# Patient Record
Sex: FEMALE | Race: BLACK OR AFRICAN AMERICAN | NOT HISPANIC OR LATINO | Employment: OTHER | URBAN - METROPOLITAN AREA
[De-identification: names, ages, dates, MRNs, and addresses within clinical notes are randomized per-mention and may not be internally consistent; named-entity substitution may affect disease eponyms.]

---

## 2023-02-08 PROBLEM — E11.9 DIABETES MELLITUS TYPE 2 WITHOUT RETINOPATHY (MULTI): Status: ACTIVE | Noted: 2023-02-08

## 2023-02-08 PROBLEM — H01.009 BLEPHARITIS: Status: ACTIVE | Noted: 2023-02-08

## 2023-02-08 PROBLEM — M79.674 GREAT TOE PAIN, RIGHT: Status: ACTIVE | Noted: 2023-02-08

## 2023-02-08 PROBLEM — I73.9 PAD (PERIPHERAL ARTERY DISEASE) (CMS-HCC): Status: ACTIVE | Noted: 2023-02-08

## 2023-02-08 PROBLEM — M54.50 CHRONIC LEFT-SIDED LOW BACK PAIN WITHOUT SCIATICA: Status: ACTIVE | Noted: 2023-02-08

## 2023-02-08 PROBLEM — K21.9 GERD (GASTROESOPHAGEAL REFLUX DISEASE): Status: ACTIVE | Noted: 2023-02-08

## 2023-02-08 PROBLEM — E03.9 HYPOTHYROIDISM: Status: ACTIVE | Noted: 2023-02-08

## 2023-02-08 PROBLEM — E51.9 VITAMIN B1 DEFICIENCY: Status: ACTIVE | Noted: 2023-02-08

## 2023-02-08 PROBLEM — H40.1111 PRIMARY OPEN ANGLE GLAUCOMA (POAG) OF RIGHT EYE, MILD STAGE: Status: ACTIVE | Noted: 2023-02-08

## 2023-02-08 PROBLEM — N18.2: Status: ACTIVE | Noted: 2023-02-08

## 2023-02-08 PROBLEM — K70.9 ALCOHOLIC LIVER DISEASE (MULTI): Status: ACTIVE | Noted: 2023-02-08

## 2023-02-08 PROBLEM — H26.493 POSTERIOR CAPSULAR OPACIFICATION VISUALLY SIGNIFICANT OF BOTH EYES: Status: ACTIVE | Noted: 2023-02-08

## 2023-02-08 PROBLEM — H40.1131 CHRONIC OPEN ANGLE GLAUCOMA OF BOTH EYES, MILD STAGE: Status: ACTIVE | Noted: 2023-02-08

## 2023-02-08 PROBLEM — R35.1 NOCTURIA: Status: ACTIVE | Noted: 2023-02-08

## 2023-02-08 PROBLEM — K70.30: Status: ACTIVE | Noted: 2023-02-08

## 2023-02-08 PROBLEM — M54.2 NECK PAIN, MUSCULOSKELETAL: Status: ACTIVE | Noted: 2023-02-08

## 2023-02-08 PROBLEM — L60.0 INGROWING TOENAIL: Status: ACTIVE | Noted: 2023-02-08

## 2023-02-08 PROBLEM — J33.9 NASAL POLYPOSIS: Status: ACTIVE | Noted: 2023-02-08

## 2023-02-08 PROBLEM — H26.499 PCO (POSTERIOR CAPSULAR OPACIFICATION): Status: ACTIVE | Noted: 2023-02-08

## 2023-02-08 PROBLEM — R04.0 EPISTAXIS: Status: ACTIVE | Noted: 2023-02-08

## 2023-02-08 PROBLEM — M54.50 LOW BACK PAIN: Status: ACTIVE | Noted: 2023-02-08

## 2023-02-08 PROBLEM — K74.60 CIRRHOSIS OF LIVER (MULTI): Status: ACTIVE | Noted: 2023-02-08

## 2023-02-08 PROBLEM — E78.1 ESSENTIAL HYPERTRIGLYCERIDEMIA: Status: ACTIVE | Noted: 2023-02-08

## 2023-02-08 PROBLEM — M25.511 ACUTE PAIN OF RIGHT SHOULDER: Status: ACTIVE | Noted: 2023-02-08

## 2023-02-08 PROBLEM — H04.123 DRY EYE SYNDROME OF BOTH LACRIMAL GLANDS: Status: ACTIVE | Noted: 2023-02-08

## 2023-02-08 PROBLEM — R22.1 LOCALIZED SWELLING, MASS AND LUMP, NECK: Status: ACTIVE | Noted: 2023-02-08

## 2023-02-08 PROBLEM — H43.399 VITREOUS FLOATERS: Status: ACTIVE | Noted: 2023-02-08

## 2023-02-08 PROBLEM — E11.9 T2DM (TYPE 2 DIABETES MELLITUS) (MULTI): Status: ACTIVE | Noted: 2023-02-08

## 2023-02-08 PROBLEM — E87.1 HYPONATREMIA: Status: ACTIVE | Noted: 2023-02-08

## 2023-02-08 PROBLEM — H40.1190 PRIMARY OPEN ANGLE GLAUCOMA: Status: ACTIVE | Noted: 2023-02-08

## 2023-02-08 PROBLEM — K76.82 HEPATIC ENCEPHALOPATHY (MULTI): Status: ACTIVE | Noted: 2023-02-08

## 2023-02-08 PROBLEM — I85.00 ESOPHAGEAL VARICES (MULTI): Status: ACTIVE | Noted: 2023-02-08

## 2023-02-08 PROBLEM — N18.31 CHRONIC KIDNEY DISEASE, STAGE 3A (MULTI): Status: ACTIVE | Noted: 2023-02-08

## 2023-02-08 PROBLEM — J34.89 NASAL MUCOSA DRY: Status: ACTIVE | Noted: 2023-02-08

## 2023-02-08 PROBLEM — E87.5 HYPERKALEMIA: Status: ACTIVE | Noted: 2023-02-08

## 2023-02-08 PROBLEM — R51.9 HEADACHE: Status: ACTIVE | Noted: 2023-02-08

## 2023-02-08 PROBLEM — M85.80 OSTEOPENIA: Status: ACTIVE | Noted: 2023-02-08

## 2023-02-08 PROBLEM — M25.512 ACUTE PAIN OF LEFT SHOULDER: Status: ACTIVE | Noted: 2023-02-08

## 2023-02-08 PROBLEM — H40.9 GLAUCOMA: Status: ACTIVE | Noted: 2023-02-08

## 2023-02-08 PROBLEM — D64.9 ANEMIA: Status: ACTIVE | Noted: 2023-02-08

## 2023-02-08 PROBLEM — Z96.1 PRESENCE OF ARTIFICIAL INTRA-OCULAR LENS: Status: ACTIVE | Noted: 2023-02-08

## 2023-02-08 PROBLEM — H40.1122 PRIMARY OPEN ANGLE GLAUCOMA (POAG) OF LEFT EYE, MODERATE STAGE: Status: ACTIVE | Noted: 2023-02-08

## 2023-02-08 PROBLEM — G89.29 CHRONIC LEFT-SIDED LOW BACK PAIN WITHOUT SCIATICA: Status: ACTIVE | Noted: 2023-02-08

## 2023-02-08 PROBLEM — E55.9 VITAMIN D DEFICIENCY: Status: ACTIVE | Noted: 2023-02-08

## 2023-02-08 PROBLEM — J30.9 ALLERGIC RHINITIS: Status: ACTIVE | Noted: 2023-02-08

## 2023-02-08 PROBLEM — E11.22: Status: ACTIVE | Noted: 2023-02-08

## 2023-02-08 PROBLEM — I12.9: Status: ACTIVE | Noted: 2023-02-08

## 2023-02-08 PROBLEM — K70.0 FATTY LIVER, ALCOHOLIC: Status: ACTIVE | Noted: 2023-02-08

## 2023-02-08 PROBLEM — H43.813 POSTERIOR VITREOUS DETACHMENT OF BOTH EYES: Status: ACTIVE | Noted: 2023-02-08

## 2023-02-08 PROBLEM — I10 BENIGN ESSENTIAL HYPERTENSION: Status: ACTIVE | Noted: 2023-02-08

## 2023-02-08 PROBLEM — K76.6 PORTAL HYPERTENSION (MULTI): Status: ACTIVE | Noted: 2023-02-08

## 2023-02-08 PROBLEM — R26.89 POOR BALANCE: Status: ACTIVE | Noted: 2023-02-08

## 2023-02-08 RX ORDER — TRIAMCINOLONE ACETONIDE 1 MG/G
1 OINTMENT TOPICAL 2 TIMES DAILY
COMMUNITY
Start: 2018-01-09

## 2023-02-08 RX ORDER — PEN NEEDLE, DIABETIC 33 GX5/32"
NEEDLE, DISPOSABLE MISCELLANEOUS
COMMUNITY
End: 2023-05-22

## 2023-02-08 RX ORDER — LATANOPROST 50 UG/ML
1 SOLUTION/ DROPS OPHTHALMIC NIGHTLY
COMMUNITY
Start: 2018-06-14 | End: 2023-10-25 | Stop reason: SDUPTHER

## 2023-02-08 RX ORDER — ACETAMINOPHEN 500 MG
50 TABLET ORAL DAILY
COMMUNITY

## 2023-02-08 RX ORDER — DORZOLAMIDE HYDROCHLORIDE AND TIMOLOL MALEATE 20; 5 MG/ML; MG/ML
1 SOLUTION/ DROPS OPHTHALMIC 2 TIMES DAILY
COMMUNITY
Start: 2018-06-14 | End: 2023-10-03 | Stop reason: SDUPTHER

## 2023-02-08 RX ORDER — PANTOPRAZOLE SODIUM 40 MG/1
40 TABLET, DELAYED RELEASE ORAL DAILY PRN
COMMUNITY
Start: 2016-12-08 | End: 2023-04-03 | Stop reason: SDUPTHER

## 2023-02-08 RX ORDER — LACTULOSE 10 G/15ML
30 SOLUTION ORAL; RECTAL 2 TIMES DAILY
COMMUNITY
Start: 2016-12-08 | End: 2023-10-05 | Stop reason: ALTCHOICE

## 2023-02-08 RX ORDER — LOSARTAN POTASSIUM 25 MG/1
25 TABLET ORAL DAILY
COMMUNITY
Start: 2018-09-14 | End: 2024-03-20 | Stop reason: SDUPTHER

## 2023-02-08 RX ORDER — CETIRIZINE HYDROCHLORIDE 10 MG/1
10 TABLET ORAL NIGHTLY
COMMUNITY
Start: 2022-09-26

## 2023-02-08 RX ORDER — INSULIN GLARGINE 100 [IU]/ML
4 INJECTION, SOLUTION SUBCUTANEOUS DAILY
COMMUNITY
Start: 2022-12-12 | End: 2023-07-18 | Stop reason: SDUPTHER

## 2023-02-08 RX ORDER — MULTIVITAMIN
1 TABLET ORAL DAILY
COMMUNITY

## 2023-02-08 RX ORDER — BLOOD-GLUCOSE METER
KIT MISCELLANEOUS 2 TIMES DAILY
COMMUNITY
Start: 2021-03-25 | End: 2023-10-05 | Stop reason: SDUPTHER

## 2023-03-13 DIAGNOSIS — E11.22 TYPE 2 DIABETES MELLITUS WITH CHRONIC KIDNEY DISEASE, WITH LONG-TERM CURRENT USE OF INSULIN, UNSPECIFIED CKD STAGE (MULTI): Primary | ICD-10-CM

## 2023-03-13 DIAGNOSIS — Z79.4 TYPE 2 DIABETES MELLITUS WITH CHRONIC KIDNEY DISEASE, WITH LONG-TERM CURRENT USE OF INSULIN, UNSPECIFIED CKD STAGE (MULTI): Primary | ICD-10-CM

## 2023-04-03 ENCOUNTER — TELEMEDICINE (OUTPATIENT)
Dept: PHARMACY | Facility: HOSPITAL | Age: 83
End: 2023-04-03

## 2023-04-03 DIAGNOSIS — K21.9 CHRONIC GERD: Primary | ICD-10-CM

## 2023-04-03 DIAGNOSIS — Z79.4 TYPE 2 DIABETES MELLITUS WITHOUT COMPLICATION, WITH LONG-TERM CURRENT USE OF INSULIN (MULTI): ICD-10-CM

## 2023-04-03 DIAGNOSIS — I10 HYPERTENSION, UNSPECIFIED TYPE: ICD-10-CM

## 2023-04-03 DIAGNOSIS — E11.9 TYPE 2 DIABETES MELLITUS WITHOUT COMPLICATION, WITH LONG-TERM CURRENT USE OF INSULIN (MULTI): ICD-10-CM

## 2023-04-03 DIAGNOSIS — Z79.4 TYPE 2 DIABETES MELLITUS WITHOUT COMPLICATION, WITH LONG-TERM CURRENT USE OF INSULIN (MULTI): Primary | ICD-10-CM

## 2023-04-03 DIAGNOSIS — E11.9 TYPE 2 DIABETES MELLITUS WITHOUT COMPLICATION, WITH LONG-TERM CURRENT USE OF INSULIN (MULTI): Primary | ICD-10-CM

## 2023-04-03 RX ORDER — PANTOPRAZOLE SODIUM 40 MG/1
40 TABLET, DELAYED RELEASE ORAL
Qty: 90 TABLET | Refills: 1 | Status: SHIPPED | OUTPATIENT
Start: 2023-04-03 | End: 2023-10-05 | Stop reason: SDUPTHER

## 2023-04-03 ASSESSMENT — ENCOUNTER SYMPTOMS
HYPERTENSION: 1
DIABETIC ASSOCIATED SYMPTOMS: 0

## 2023-04-03 NOTE — PROGRESS NOTES
Subjective   Patient ID: Yulia Fonseca is a 82 y.o. female who presents for type 2 diabetes and hypertension follow up.    Referring Provider: Yanique Bedoya DO     Diabetes  She presents for her follow-up diabetic visit. She has type 2 diabetes mellitus. Her disease course has been stable. There are no hypoglycemic associated symptoms. There are no diabetic associated symptoms. Diabetic complications include nephropathy. Current diabetic treatment includes insulin injections. She is compliant with treatment all of the time. Her weight is stable. There is no change in her home blood glucose trend. Her breakfast blood glucose range is generally  mg/dl. Her bedtime blood glucose range is generally 140-180 mg/dl. Eye exam is current.   Hypertension  This is a chronic problem. The problem is unchanged. The problem is controlled.       Review of Systems   All other systems reviewed and are negative.      Objective     There were no vitals taken for this visit.     Labs  Lab Results   Component Value Date    BILITOT 0.7 12/08/2022    CALCIUM 10.0 12/08/2022    CO2 22 12/08/2022     (H) 12/08/2022    CREATININE 1.18 (H) 12/08/2022    GLUCOSE 118 (H) 12/08/2022    ALKPHOS 129 12/08/2022    K 5.0 12/08/2022    PROT 7.7 12/08/2022     12/08/2022    AST 37 12/08/2022    ALT 26 12/08/2022    BUN 23 12/08/2022    ANIONGAP 16 12/08/2022    MG 1.72 03/28/2021    PHOS 2.9 03/28/2021    ALBUMIN 4.2 12/08/2022    GFRF 46 (A) 12/08/2022     Lab Results   Component Value Date    TRIG 87 03/24/2021    CHOL 92 03/24/2021    HDL 18.6 (A) 03/24/2021     Lab Results   Component Value Date    HGBA1C 12.2 03/24/2021       Assessment/Plan     Yulia is doing well. Both her blood sugar levels and blood pressure readings have been stable. Her most recent A1c from 7/6/22 was 6.1%. Her fasting blood sugar ranges from  mg/dL and her bedtime blood sugar ranges from 140-170 mg/dL. Her recent blood pressure readings average  110/70 mmHg. She has requested a refill for her pantoprazole.     Yulia no longer wishes to receive pharmacy services at this time. She may contact the Pharmacy Team for further assistance, if required.     Aleshia Barrow, PharmD

## 2023-04-04 NOTE — PROGRESS NOTES
I reviewed the progress note and agree with the resident’s findings and plans as written. Case discussed with resident.    Mara Cespedes, JaiD

## 2023-05-22 DIAGNOSIS — I12.9 TYPE 2 DIABETES MELLITUS WITH STAGE 2 CHRONIC KIDNEY DISEASE AND HYPERTENSION (MULTI): Primary | ICD-10-CM

## 2023-05-22 DIAGNOSIS — E11.22 TYPE 2 DIABETES MELLITUS WITH STAGE 2 CHRONIC KIDNEY DISEASE AND HYPERTENSION (MULTI): Primary | ICD-10-CM

## 2023-05-22 DIAGNOSIS — N18.2 TYPE 2 DIABETES MELLITUS WITH STAGE 2 CHRONIC KIDNEY DISEASE AND HYPERTENSION (MULTI): Primary | ICD-10-CM

## 2023-05-22 RX ORDER — PEN NEEDLE, DIABETIC 32GX 5/32"
NEEDLE, DISPOSABLE MISCELLANEOUS
COMMUNITY
Start: 2023-01-31 | End: 2023-05-22 | Stop reason: SDUPTHER

## 2023-05-23 DIAGNOSIS — E11.22 TYPE 2 DIABETES MELLITUS WITH STAGE 2 CHRONIC KIDNEY DISEASE AND HYPERTENSION (MULTI): ICD-10-CM

## 2023-05-23 DIAGNOSIS — I12.9 TYPE 2 DIABETES MELLITUS WITH STAGE 2 CHRONIC KIDNEY DISEASE AND HYPERTENSION (MULTI): ICD-10-CM

## 2023-05-23 DIAGNOSIS — N18.2 TYPE 2 DIABETES MELLITUS WITH STAGE 2 CHRONIC KIDNEY DISEASE AND HYPERTENSION (MULTI): ICD-10-CM

## 2023-05-23 RX ORDER — PEN NEEDLE, DIABETIC 32GX 5/32"
NEEDLE, DISPOSABLE MISCELLANEOUS
Qty: 100 EACH | Refills: 3 | Status: SHIPPED | OUTPATIENT
Start: 2023-05-23 | End: 2023-05-23 | Stop reason: SDUPTHER

## 2023-05-24 RX ORDER — PEN NEEDLE, DIABETIC 32GX 5/32"
NEEDLE, DISPOSABLE MISCELLANEOUS
Qty: 100 EACH | Refills: 3 | Status: SHIPPED | OUTPATIENT
Start: 2023-05-24

## 2023-07-18 ENCOUNTER — OFFICE VISIT (OUTPATIENT)
Dept: PRIMARY CARE | Facility: CLINIC | Age: 83
End: 2023-07-18
Payer: MEDICARE

## 2023-07-18 ENCOUNTER — LAB (OUTPATIENT)
Dept: LAB | Facility: LAB | Age: 83
End: 2023-07-18
Payer: MEDICARE

## 2023-07-18 VITALS
OXYGEN SATURATION: 98 % | HEART RATE: 99 BPM | WEIGHT: 104.6 LBS | HEIGHT: 62 IN | BODY MASS INDEX: 19.25 KG/M2 | RESPIRATION RATE: 16 BRPM | DIASTOLIC BLOOD PRESSURE: 70 MMHG | SYSTOLIC BLOOD PRESSURE: 120 MMHG | TEMPERATURE: 97 F

## 2023-07-18 DIAGNOSIS — Z13.89 ENCOUNTER FOR SCREENING FOR OTHER DISORDER: ICD-10-CM

## 2023-07-18 DIAGNOSIS — N18.31 CHRONIC KIDNEY DISEASE, STAGE 3A (MULTI): ICD-10-CM

## 2023-07-18 DIAGNOSIS — K70.0 FATTY LIVER, ALCOHOLIC: ICD-10-CM

## 2023-07-18 DIAGNOSIS — I73.9 PAD (PERIPHERAL ARTERY DISEASE) (CMS-HCC): ICD-10-CM

## 2023-07-18 DIAGNOSIS — Z79.4 TYPE 2 DIABETES MELLITUS WITH HYPERGLYCEMIA, WITH LONG-TERM CURRENT USE OF INSULIN (MULTI): ICD-10-CM

## 2023-07-18 DIAGNOSIS — Z00.00 ENCOUNTER FOR MEDICARE ANNUAL WELLNESS EXAM: ICD-10-CM

## 2023-07-18 DIAGNOSIS — Z00.00 ENCOUNTER FOR MEDICARE ANNUAL WELLNESS EXAM: Primary | ICD-10-CM

## 2023-07-18 DIAGNOSIS — E11.65 TYPE 2 DIABETES MELLITUS WITH HYPERGLYCEMIA, WITH LONG-TERM CURRENT USE OF INSULIN (MULTI): ICD-10-CM

## 2023-07-18 DIAGNOSIS — I85.00 ESOPHAGEAL VARICES WITHOUT BLEEDING, UNSPECIFIED ESOPHAGEAL VARICES TYPE (MULTI): ICD-10-CM

## 2023-07-18 DIAGNOSIS — E46 PROTEIN-CALORIE MALNUTRITION, UNSPECIFIED SEVERITY (MULTI): ICD-10-CM

## 2023-07-18 LAB
ALANINE AMINOTRANSFERASE (SGPT) (U/L) IN SER/PLAS: 27 U/L (ref 7–45)
ALBUMIN (G/DL) IN SER/PLAS: 4.9 G/DL (ref 3.4–5)
ALBUMIN (MG/L) IN URINE: 7.7 MG/L
ALBUMIN/CREATININE (UG/MG) IN URINE: 25.7 UG/MG CRT (ref 0–30)
ALKALINE PHOSPHATASE (U/L) IN SER/PLAS: 120 U/L (ref 33–136)
ANION GAP IN SER/PLAS: 16 MMOL/L (ref 10–20)
ASPARTATE AMINOTRANSFERASE (SGOT) (U/L) IN SER/PLAS: 42 U/L (ref 9–39)
BASOPHILS (10*3/UL) IN BLOOD BY AUTOMATED COUNT: 0.03 X10E9/L (ref 0–0.1)
BASOPHILS/100 LEUKOCYTES IN BLOOD BY AUTOMATED COUNT: 0.6 % (ref 0–2)
BILIRUBIN TOTAL (MG/DL) IN SER/PLAS: 0.8 MG/DL (ref 0–1.2)
CALCIUM (MG/DL) IN SER/PLAS: 10.3 MG/DL (ref 8.6–10.6)
CARBON DIOXIDE, TOTAL (MMOL/L) IN SER/PLAS: 22 MMOL/L (ref 21–32)
CHLORIDE (MMOL/L) IN SER/PLAS: 108 MMOL/L (ref 98–107)
CREATININE (MG/DL) IN SER/PLAS: 1.24 MG/DL (ref 0.5–1.05)
CREATININE (MG/DL) IN URINE: 30 MG/DL (ref 20–320)
EOSINOPHILS (10*3/UL) IN BLOOD BY AUTOMATED COUNT: 0.18 X10E9/L (ref 0–0.4)
EOSINOPHILS/100 LEUKOCYTES IN BLOOD BY AUTOMATED COUNT: 3.9 % (ref 0–6)
ERYTHROCYTE DISTRIBUTION WIDTH (RATIO) BY AUTOMATED COUNT: 13.9 % (ref 11.5–14.5)
ERYTHROCYTE MEAN CORPUSCULAR HEMOGLOBIN CONCENTRATION (G/DL) BY AUTOMATED: 33.2 G/DL (ref 32–36)
ERYTHROCYTE MEAN CORPUSCULAR VOLUME (FL) BY AUTOMATED COUNT: 84 FL (ref 80–100)
ERYTHROCYTES (10*6/UL) IN BLOOD BY AUTOMATED COUNT: 4.43 X10E12/L (ref 4–5.2)
ESTIMATED AVERAGE GLUCOSE FOR HBA1C: 140 MG/DL
GFR FEMALE: 43 ML/MIN/1.73M2
GLUCOSE (MG/DL) IN SER/PLAS: 133 MG/DL (ref 74–99)
HEMATOCRIT (%) IN BLOOD BY AUTOMATED COUNT: 37.1 % (ref 36–46)
HEMOGLOBIN (G/DL) IN BLOOD: 12.3 G/DL (ref 12–16)
HEMOGLOBIN A1C/HEMOGLOBIN TOTAL IN BLOOD: 6.5 %
IMMATURE GRANULOCYTES/100 LEUKOCYTES IN BLOOD BY AUTOMATED COUNT: 0.2 % (ref 0–0.9)
INR IN PPP BY COAGULATION ASSAY: 1.1 (ref 0.9–1.1)
LEUKOCYTES (10*3/UL) IN BLOOD BY AUTOMATED COUNT: 4.7 X10E9/L (ref 4.4–11.3)
LYMPHOCYTES (10*3/UL) IN BLOOD BY AUTOMATED COUNT: 1.16 X10E9/L (ref 0.8–3)
LYMPHOCYTES/100 LEUKOCYTES IN BLOOD BY AUTOMATED COUNT: 24.9 % (ref 13–44)
MONOCYTES (10*3/UL) IN BLOOD BY AUTOMATED COUNT: 0.42 X10E9/L (ref 0.05–0.8)
MONOCYTES/100 LEUKOCYTES IN BLOOD BY AUTOMATED COUNT: 9 % (ref 2–10)
NEUTROPHILS (10*3/UL) IN BLOOD BY AUTOMATED COUNT: 2.86 X10E9/L (ref 1.6–5.5)
NEUTROPHILS/100 LEUKOCYTES IN BLOOD BY AUTOMATED COUNT: 61.4 % (ref 40–80)
NRBC (PER 100 WBCS) BY AUTOMATED COUNT: 0 /100 WBC (ref 0–0)
PLATELETS (10*3/UL) IN BLOOD AUTOMATED COUNT: 166 X10E9/L (ref 150–450)
POTASSIUM (MMOL/L) IN SER/PLAS: 4.5 MMOL/L (ref 3.5–5.3)
PROTEIN TOTAL: 8.4 G/DL (ref 6.4–8.2)
PROTHROMBIN TIME (PT) IN PPP BY COAGULATION ASSAY: 12.5 SEC (ref 9.8–12.8)
SODIUM (MMOL/L) IN SER/PLAS: 141 MMOL/L (ref 136–145)
UREA NITROGEN (MG/DL) IN SER/PLAS: 24 MG/DL (ref 6–23)

## 2023-07-18 PROCEDURE — 85610 PROTHROMBIN TIME: CPT

## 2023-07-18 PROCEDURE — 1036F TOBACCO NON-USER: CPT | Performed by: FAMILY MEDICINE

## 2023-07-18 PROCEDURE — 3078F DIAST BP <80 MM HG: CPT | Performed by: FAMILY MEDICINE

## 2023-07-18 PROCEDURE — 1170F FXNL STATUS ASSESSED: CPT | Performed by: FAMILY MEDICINE

## 2023-07-18 PROCEDURE — 3074F SYST BP LT 130 MM HG: CPT | Performed by: FAMILY MEDICINE

## 2023-07-18 PROCEDURE — 83036 HEMOGLOBIN GLYCOSYLATED A1C: CPT

## 2023-07-18 PROCEDURE — 99397 PER PM REEVAL EST PAT 65+ YR: CPT | Performed by: FAMILY MEDICINE

## 2023-07-18 PROCEDURE — 80053 COMPREHEN METABOLIC PANEL: CPT

## 2023-07-18 PROCEDURE — 85025 COMPLETE CBC W/AUTO DIFF WBC: CPT

## 2023-07-18 PROCEDURE — 82570 ASSAY OF URINE CREATININE: CPT

## 2023-07-18 PROCEDURE — 36415 COLL VENOUS BLD VENIPUNCTURE: CPT

## 2023-07-18 PROCEDURE — 1125F AMNT PAIN NOTED PAIN PRSNT: CPT | Performed by: FAMILY MEDICINE

## 2023-07-18 PROCEDURE — 82043 UR ALBUMIN QUANTITATIVE: CPT

## 2023-07-18 PROCEDURE — 1159F MED LIST DOCD IN RCRD: CPT | Performed by: FAMILY MEDICINE

## 2023-07-18 PROCEDURE — G0439 PPPS, SUBSEQ VISIT: HCPCS | Performed by: FAMILY MEDICINE

## 2023-07-18 PROCEDURE — 1160F RVW MEDS BY RX/DR IN RCRD: CPT | Performed by: FAMILY MEDICINE

## 2023-07-18 PROCEDURE — G0444 DEPRESSION SCREEN ANNUAL: HCPCS | Performed by: FAMILY MEDICINE

## 2023-07-18 RX ORDER — AMLODIPINE BESYLATE 10 MG/1
TABLET ORAL
COMMUNITY
Start: 2023-02-21 | End: 2023-10-05 | Stop reason: ALTCHOICE

## 2023-07-18 RX ORDER — INSULIN GLARGINE 100 [IU]/ML
4 INJECTION, SOLUTION SUBCUTANEOUS DAILY
Qty: 4 EACH | Refills: 3 | Status: SHIPPED | OUTPATIENT
Start: 2023-07-18 | End: 2023-10-31 | Stop reason: SDUPTHER

## 2023-07-18 RX ORDER — ARTIFICIAL TEARS 1; 2; 3 MG/ML; MG/ML; MG/ML
SOLUTION/ DROPS OPHTHALMIC 4 TIMES DAILY
COMMUNITY

## 2023-07-18 RX ORDER — FLUTICASONE PROPIONATE 50 MCG
SPRAY, SUSPENSION (ML) NASAL
COMMUNITY
End: 2024-01-24 | Stop reason: WASHOUT

## 2023-07-18 ASSESSMENT — ACTIVITIES OF DAILY LIVING (ADL)
TAKING_MEDICATION: INDEPENDENT
DOING_HOUSEWORK: INDEPENDENT
GROCERY_SHOPPING: INDEPENDENT
MANAGING_FINANCES: INDEPENDENT
BATHING: INDEPENDENT
DRESSING: INDEPENDENT

## 2023-07-18 ASSESSMENT — PATIENT HEALTH QUESTIONNAIRE - PHQ9
1. LITTLE INTEREST OR PLEASURE IN DOING THINGS: NOT AT ALL
2. FEELING DOWN, DEPRESSED OR HOPELESS: NOT AT ALL
SUM OF ALL RESPONSES TO PHQ9 QUESTIONS 1 AND 2: 0

## 2023-07-18 ASSESSMENT — LIFESTYLE VARIABLES: HOW MANY STANDARD DRINKS CONTAINING ALCOHOL DO YOU HAVE ON A TYPICAL DAY: PATIENT DOES NOT DRINK

## 2023-07-18 NOTE — PROGRESS NOTES
"Subjective   Reason for Visit: Yulia Fonseca is an 82 y.o. female here for a Medicare Wellness visit.     Past Medical, Surgical, and Family History reviewed and updated in chart.    Reviewed all medications by prescribing practitioner or clinical pharmacist (such as prescriptions, OTCs, herbal therapies and supplements) and documented in the medical record.    HPI      Patient Care Team:  Yanique Bedoya DO as PCP - General  Yanique Bedoya DO as PCP - [employer] PCP       2 wks ago bumped her head o the window- on and off while laying down having some headaches  Neck pain as well   No dizziness, LOC  No chagne in vision    Review of Systems  All systems reviewed and neg if not noted in the HPI above       Objective   Vitals:  /70   Pulse 99   Temp 36.1 °C (97 °F)   Resp 16   Ht 1.575 m (5' 2\")   Wt 47.4 kg (104 lb 9.6 oz)   SpO2 98%   BMI 19.13 kg/m²       Physical Exam    Pleasant  Eyes: conjunctiva non-icteric and eye lids are without obvious rash or drooping. Pupils are symmetric.   Ears, Nose, Mouth, and Throat: External ears and nose appear to be without deformity or rash. No lesions or masses noted. Hearing is grossly intact.   CV: RRR, no murmur  Carotids: no bruits  Pulm:CTA B/L  Abd: soft, NTTP, + BS  LE: no edema  Psychiatric: Alert, orientation to person, place, and time. Recent/remote memory as evidenced through face-to-face interaction and discussion appear grossly intact. Mood and affect are normal.            Assessment/Plan   Problem List Items Addressed This Visit       Fatty liver, alcoholic    Relevant Orders    Protime-INR    Chronic kidney disease, stage 3a    Current Assessment & Plan     Stable  Rechecking today  Continue ARB         Relevant Orders    Comprehensive Metabolic Panel    CBC and Auto Differential    Hemoglobin A1C    T2DM (type 2 diabetes mellitus) (CMS/HCC)    Relevant Orders    Comprehensive Metabolic Panel    CBC and Auto Differential    Hemoglobin " A1C    Albumin , Urine Random    Esophageal varices (CMS/HCC)    Current Assessment & Plan     No pain- doing well, no symtpoms- followed by GI  Will continue to monitor         PAD (peripheral artery disease) (CMS/HCC)    Current Assessment & Plan     Stbale  No pain  No statin due to liver hx  right now  Will continue to monitor         Protein-calorie malnutrition, unspecified severity (CMS/HCC)    Current Assessment & Plan     Wt is stable  Continue with healthy diet         Relevant Orders    Protime-INR     Other Visit Diagnoses       Encounter for Medicare annual wellness exam    -  Primary    Relevant Orders    Comprehensive Metabolic Panel    CBC and Auto Differential    Hemoglobin A1C    Encounter for screening for other disorder                     Please follow up in  1 yr for medicare wellness and 6months for HTN/DM or as needed.

## 2023-07-18 NOTE — PATIENT INSTRUCTIONS
Neck /head pain  - better over all  - range of motion exercises  - letme know if not better for referral for PT      HTN  - Your blood pressure is at goal today- goal is less than 130/80  - Continue your current medications  - Weight loss can help lower your bp!  Work on a healthy whole food diet and add at least 30min of exercise 5 days per week  - Work on a low salt diet     Labs today      Please follow up in  1 yr for medicare wellness and 6months for HTN/Dm or as needed.       ** If labs or imaging ordered at today's visit, all the non-urgent results will be discussed at your next visit    If you have been referred for a special test or to a specialist please call  [phone] to schedule an appointment.  If you have any further questions, or if develop new or worsened symptoms, please give our office a call at [phone].

## 2023-07-18 NOTE — ASSESSMENT & PLAN NOTE
Using 4 unit lantus  BS have been up some  Checking BS at home has both glucometer and supriya  Rechecking [id number]

## 2023-07-28 ASSESSMENT — ACTIVITIES OF DAILY LIVING (ADL)
DRESSING: INDEPENDENT
GROCERY_SHOPPING: INDEPENDENT
BATHING: INDEPENDENT
TAKING_MEDICATION: INDEPENDENT
DOING_HOUSEWORK: INDEPENDENT
MANAGING_FINANCES: INDEPENDENT

## 2023-07-28 ASSESSMENT — PATIENT HEALTH QUESTIONNAIRE - PHQ9
2. FEELING DOWN, DEPRESSED OR HOPELESS: NOT AT ALL
1. LITTLE INTEREST OR PLEASURE IN DOING THINGS: NOT AT ALL
SUM OF ALL RESPONSES TO PHQ9 QUESTIONS 1 AND 2: 0

## 2023-09-12 DIAGNOSIS — E11.9 DIABETES MELLITUS TYPE 2 WITHOUT RETINOPATHY (MULTI): Primary | ICD-10-CM

## 2023-09-12 RX ORDER — BLOOD-GLUCOSE METER
EACH MISCELLANEOUS
COMMUNITY
End: 2023-09-12 | Stop reason: SDUPTHER

## 2023-09-14 RX ORDER — BLOOD-GLUCOSE METER
1-2 EACH MISCELLANEOUS 2 TIMES DAILY
Qty: 200 STRIP | Refills: 3 | Status: SHIPPED | OUTPATIENT
Start: 2023-09-14 | End: 2023-11-29 | Stop reason: SDUPTHER

## 2023-09-26 ENCOUNTER — APPOINTMENT (OUTPATIENT)
Dept: PRIMARY CARE | Facility: CLINIC | Age: 83
End: 2023-09-26
Payer: MEDICARE

## 2023-09-29 ENCOUNTER — TELEPHONE (OUTPATIENT)
Dept: PRIMARY CARE | Facility: CLINIC | Age: 83
End: 2023-09-29
Payer: MEDICARE

## 2023-10-03 DIAGNOSIS — H40.9 GLAUCOMA OF BOTH EYES, UNSPECIFIED GLAUCOMA TYPE: Primary | ICD-10-CM

## 2023-10-04 RX ORDER — DORZOLAMIDE HYDROCHLORIDE AND TIMOLOL MALEATE 20; 5 MG/ML; MG/ML
1 SOLUTION/ DROPS OPHTHALMIC 2 TIMES DAILY
Qty: 10 ML | Refills: 3 | Status: SHIPPED | OUTPATIENT
Start: 2023-10-04 | End: 2023-10-25 | Stop reason: SDUPTHER

## 2023-10-05 ENCOUNTER — OFFICE VISIT (OUTPATIENT)
Dept: PRIMARY CARE | Facility: CLINIC | Age: 83
End: 2023-10-05
Payer: MEDICARE

## 2023-10-05 VITALS
BODY MASS INDEX: 19.56 KG/M2 | RESPIRATION RATE: 15 BRPM | SYSTOLIC BLOOD PRESSURE: 122 MMHG | HEIGHT: 62 IN | OXYGEN SATURATION: 98 % | DIASTOLIC BLOOD PRESSURE: 60 MMHG | HEART RATE: 66 BPM | WEIGHT: 106.3 LBS | TEMPERATURE: 97.2 F

## 2023-10-05 DIAGNOSIS — K21.9 CHRONIC GERD: ICD-10-CM

## 2023-10-05 DIAGNOSIS — I10 BENIGN ESSENTIAL HYPERTENSION: Primary | ICD-10-CM

## 2023-10-05 DIAGNOSIS — E11.9 DIABETES MELLITUS TYPE 2 WITHOUT RETINOPATHY (MULTI): ICD-10-CM

## 2023-10-05 PROCEDURE — 3074F SYST BP LT 130 MM HG: CPT | Performed by: STUDENT IN AN ORGANIZED HEALTH CARE EDUCATION/TRAINING PROGRAM

## 2023-10-05 PROCEDURE — 1036F TOBACCO NON-USER: CPT | Performed by: STUDENT IN AN ORGANIZED HEALTH CARE EDUCATION/TRAINING PROGRAM

## 2023-10-05 PROCEDURE — 99213 OFFICE O/P EST LOW 20 MIN: CPT | Performed by: STUDENT IN AN ORGANIZED HEALTH CARE EDUCATION/TRAINING PROGRAM

## 2023-10-05 PROCEDURE — 1126F AMNT PAIN NOTED NONE PRSNT: CPT | Performed by: STUDENT IN AN ORGANIZED HEALTH CARE EDUCATION/TRAINING PROGRAM

## 2023-10-05 PROCEDURE — 3078F DIAST BP <80 MM HG: CPT | Performed by: STUDENT IN AN ORGANIZED HEALTH CARE EDUCATION/TRAINING PROGRAM

## 2023-10-05 PROCEDURE — 1159F MED LIST DOCD IN RCRD: CPT | Performed by: STUDENT IN AN ORGANIZED HEALTH CARE EDUCATION/TRAINING PROGRAM

## 2023-10-05 PROCEDURE — 1160F RVW MEDS BY RX/DR IN RCRD: CPT | Performed by: STUDENT IN AN ORGANIZED HEALTH CARE EDUCATION/TRAINING PROGRAM

## 2023-10-05 RX ORDER — BLOOD-GLUCOSE METER
KIT MISCELLANEOUS
Qty: 100 STRIP | Refills: 1 | Status: SHIPPED | OUTPATIENT
Start: 2023-10-05 | End: 2023-10-31 | Stop reason: SDUPTHER

## 2023-10-05 RX ORDER — MUPIROCIN 20 MG/G
OINTMENT TOPICAL
COMMUNITY
Start: 2023-09-14

## 2023-10-05 RX ORDER — AMLODIPINE BESYLATE 5 MG/1
5 TABLET ORAL DAILY
Qty: 90 TABLET | Refills: 1 | Status: SHIPPED | OUTPATIENT
Start: 2023-10-05 | End: 2024-01-08 | Stop reason: SDUPTHER

## 2023-10-05 RX ORDER — DOXYCYCLINE 100 MG/1
TABLET ORAL
COMMUNITY
Start: 2023-09-14 | End: 2023-10-05 | Stop reason: ALTCHOICE

## 2023-10-05 RX ORDER — PANTOPRAZOLE SODIUM 40 MG/1
40 TABLET, DELAYED RELEASE ORAL
Qty: 90 TABLET | Refills: 1 | Status: SHIPPED | OUTPATIENT
Start: 2023-10-05 | End: 2024-03-20 | Stop reason: SDUPTHER

## 2023-10-05 ASSESSMENT — LIFESTYLE VARIABLES
HOW OFTEN DO YOU HAVE SIX OR MORE DRINKS ON ONE OCCASION: NEVER
HOW MANY STANDARD DRINKS CONTAINING ALCOHOL DO YOU HAVE ON A TYPICAL DAY: PATIENT DOES NOT DRINK
HOW OFTEN DO YOU HAVE A DRINK CONTAINING ALCOHOL: NEVER
AUDIT-C TOTAL SCORE: 0
SKIP TO QUESTIONS 9-10: 1

## 2023-10-05 ASSESSMENT — PATIENT HEALTH QUESTIONNAIRE - PHQ9
2. FEELING DOWN, DEPRESSED OR HOPELESS: NOT AT ALL
1. LITTLE INTEREST OR PLEASURE IN DOING THINGS: NOT AT ALL
SUM OF ALL RESPONSES TO PHQ9 QUESTIONS 1 & 2: 0

## 2023-10-05 NOTE — PATIENT INSTRUCTIONS
The swelling on the face appears to be healing well. You can use topical benadryl as needed for itchiness   Continue with current medications.  If blood work or imaging were ordered during your visit, all the nonurgent lab results will be discussed with you at your next office visit.  Please arrive 15 minutes before your appointment.   Return to office in [place] with Dr. Bedoya or as needed

## 2023-10-05 NOTE — PROGRESS NOTES
Subjective   Patient ID: Yulia Fonseca is a pleasant 82 y.o. female who presents for ER Follow-up (Bump on right side of face).  HPI  She went to  at [facility] for a swelling on the right side of her face and she was given abx . She completed the abx . She states the swelling drained.   It is now improved.  She has occasional itching around the area with no active drainage.     Review of Systems   All other systems reviewed and are negative.      Visit Vitals  /60   Pulse 66   Temp 36.2 °C (97.2 °F)   Resp 15          Objective   Physical Exam  Constitutional:       General: She is not in acute distress.     Appearance: Normal appearance. She is well-developed and well-groomed.   HENT:      Head: Normocephalic and atraumatic.   Eyes:      General: Lids are normal. No scleral icterus.     Conjunctiva/sclera: Conjunctivae normal.   Pulmonary:      Effort: Pulmonary effort is normal. No accessory muscle usage.   Skin:     General: Skin is warm and dry.      Comments: Small hyperpigmented lesion on the right cheek with no active drainage or surrounding erythema   Neurological:      Mental Status: She is alert and oriented to person, place, and time. Mental status is at baseline.   Psychiatric:         Attention and Perception: Attention normal.         Mood and Affect: Mood and affect normal.         Speech: Speech normal.         Behavior: Behavior normal.         Thought Content: Thought content normal.         Cognition and Memory: Cognition and memory normal.         Judgment: Judgment normal.         Assessment/Plan   Problem List Items Addressed This Visit       Benign essential hypertension - Primary    Relevant Medications    amLODIPine (Norvasc) 5 mg tablet    Diabetes mellitus type 2 without retinopathy (CMS/HCC)    Relevant Medications    FreeStyle Lite Strips strip     Other Visit Diagnoses       Chronic GERD        Relevant Medications    pantoprazole (ProtoNix) 40 mg EC tablet

## 2023-10-16 ENCOUNTER — TELEPHONE (OUTPATIENT)
Dept: GASTROENTEROLOGY | Facility: HOSPITAL | Age: 83
End: 2023-10-16
Payer: MEDICARE

## 2023-10-25 ENCOUNTER — SPECIALTY PHARMACY (OUTPATIENT)
Dept: PHARMACY | Facility: CLINIC | Age: 83
End: 2023-10-25

## 2023-10-25 ENCOUNTER — PHARMACY VISIT (OUTPATIENT)
Dept: PHARMACY | Facility: CLINIC | Age: 83
End: 2023-10-25
Payer: MEDICARE

## 2023-10-25 DIAGNOSIS — H40.9 GLAUCOMA OF BOTH EYES, UNSPECIFIED GLAUCOMA TYPE: Primary | ICD-10-CM

## 2023-10-25 PROCEDURE — RXMED WILLOW AMBULATORY MEDICATION CHARGE

## 2023-10-25 RX ORDER — DORZOLAMIDE HYDROCHLORIDE AND TIMOLOL MALEATE 20; 5 MG/ML; MG/ML
1 SOLUTION/ DROPS OPHTHALMIC 2 TIMES DAILY
Qty: 10 ML | Refills: 3 | Status: SHIPPED | OUTPATIENT
Start: 2023-10-25 | End: 2023-11-02 | Stop reason: SDUPTHER

## 2023-10-25 RX ORDER — LATANOPROST 50 UG/ML
1 SOLUTION/ DROPS OPHTHALMIC NIGHTLY
Qty: 2.5 ML | Refills: 6 | Status: SHIPPED | OUTPATIENT
Start: 2023-10-25 | End: 2023-12-14 | Stop reason: SDUPTHER

## 2023-10-31 DIAGNOSIS — Z79.4 TYPE 2 DIABETES MELLITUS WITH HYPERGLYCEMIA, WITH LONG-TERM CURRENT USE OF INSULIN (MULTI): ICD-10-CM

## 2023-10-31 DIAGNOSIS — E11.9 DIABETES MELLITUS TYPE 2 WITHOUT RETINOPATHY (MULTI): ICD-10-CM

## 2023-10-31 DIAGNOSIS — E11.65 TYPE 2 DIABETES MELLITUS WITH HYPERGLYCEMIA, WITH LONG-TERM CURRENT USE OF INSULIN (MULTI): ICD-10-CM

## 2023-11-01 RX ORDER — INSULIN GLARGINE 100 [IU]/ML
4 INJECTION, SOLUTION SUBCUTANEOUS DAILY
Qty: 4 EACH | Refills: 3 | Status: SHIPPED | OUTPATIENT
Start: 2023-11-01 | End: 2024-03-20 | Stop reason: SDUPTHER

## 2023-11-01 RX ORDER — BLOOD-GLUCOSE METER
KIT MISCELLANEOUS
Qty: 100 STRIP | Refills: 1 | Status: SHIPPED | OUTPATIENT
Start: 2023-11-01 | End: 2023-11-29 | Stop reason: ALTCHOICE

## 2023-11-02 DIAGNOSIS — H40.9 GLAUCOMA OF BOTH EYES, UNSPECIFIED GLAUCOMA TYPE: Primary | ICD-10-CM

## 2023-11-02 RX ORDER — DORZOLAMIDE HYDROCHLORIDE AND TIMOLOL MALEATE 20; 5 MG/ML; MG/ML
1 SOLUTION/ DROPS OPHTHALMIC 2 TIMES DAILY
Qty: 10 ML | Refills: 3 | Status: SHIPPED | OUTPATIENT
Start: 2023-11-02 | End: 2023-12-14 | Stop reason: SDUPTHER

## 2023-11-28 ENCOUNTER — SPECIALTY PHARMACY (OUTPATIENT)
Dept: PHARMACY | Facility: CLINIC | Age: 83
End: 2023-11-28

## 2023-11-28 ENCOUNTER — PHARMACY VISIT (OUTPATIENT)
Dept: PHARMACY | Facility: CLINIC | Age: 83
End: 2023-11-28
Payer: MEDICARE

## 2023-11-28 DIAGNOSIS — E11.9 DIABETES MELLITUS TYPE 2 WITHOUT RETINOPATHY (MULTI): ICD-10-CM

## 2023-11-28 PROCEDURE — RXMED WILLOW AMBULATORY MEDICATION CHARGE

## 2023-11-28 NOTE — TELEPHONE ENCOUNTER
Patient saw Dr. Martin on 10/05/23. Patient stated she's out of refills and test strips.    blood sugar diagnostic (OneTouch Verio test strips)   [id number] - [place], [place]  Phone: [phone]   Fax: [phone]

## 2023-11-29 RX ORDER — LANCETS
EACH MISCELLANEOUS
COMMUNITY
End: 2023-11-29 | Stop reason: SDUPTHER

## 2023-11-29 RX ORDER — LACTULOSE 10 G/15ML
SOLUTION ORAL; RECTAL
COMMUNITY
Start: 2023-10-17 | End: 2024-01-26 | Stop reason: SDUPTHER

## 2023-11-29 RX ORDER — BLOOD-GLUCOSE METER
EACH MISCELLANEOUS
COMMUNITY
End: 2024-01-24 | Stop reason: WASHOUT

## 2023-11-30 NOTE — TELEPHONE ENCOUNTER
Patient called in concerning her Rx refill I explained to her that the Rx refill needs to be signed off on by Dr Bedoya

## 2023-12-01 RX ORDER — BLOOD-GLUCOSE METER
EACH MISCELLANEOUS
Qty: 200 STRIP | Refills: 3 | Status: SHIPPED | OUTPATIENT
Start: 2023-12-01 | End: 2024-03-20 | Stop reason: SDUPTHER

## 2023-12-01 RX ORDER — LANCETS
EACH MISCELLANEOUS
Qty: 200 EACH | Refills: 3 | Status: SHIPPED | OUTPATIENT
Start: 2023-12-01

## 2023-12-14 ENCOUNTER — OFFICE VISIT (OUTPATIENT)
Dept: OPHTHALMOLOGY | Facility: CLINIC | Age: 83
End: 2023-12-14
Payer: MEDICARE

## 2023-12-14 DIAGNOSIS — H40.9 GLAUCOMA OF BOTH EYES, UNSPECIFIED GLAUCOMA TYPE: ICD-10-CM

## 2023-12-14 DIAGNOSIS — H40.1131 CHRONIC OPEN ANGLE GLAUCOMA OF BOTH EYES, MILD STAGE: Primary | ICD-10-CM

## 2023-12-14 PROCEDURE — 92133 CPTRZD OPH DX IMG PST SGM ON: CPT | Performed by: OPHTHALMOLOGY

## 2023-12-14 PROCEDURE — 99214 OFFICE O/P EST MOD 30 MIN: CPT | Performed by: OPHTHALMOLOGY

## 2023-12-14 RX ORDER — LATANOPROST 50 UG/ML
1 SOLUTION/ DROPS OPHTHALMIC NIGHTLY
Qty: 7.5 ML | Refills: 3 | Status: SHIPPED | OUTPATIENT
Start: 2023-12-14 | End: 2024-06-04 | Stop reason: SDUPTHER

## 2023-12-14 RX ORDER — DORZOLAMIDE HYDROCHLORIDE AND TIMOLOL MALEATE 20; 5 MG/ML; MG/ML
1 SOLUTION/ DROPS OPHTHALMIC 2 TIMES DAILY
Qty: 30 ML | Refills: 3 | Status: SHIPPED | OUTPATIENT
Start: 2023-12-14 | End: 2024-06-04 | Stop reason: SDUPTHER

## 2023-12-14 ASSESSMENT — ENCOUNTER SYMPTOMS
HEMATOLOGIC/LYMPHATIC NEGATIVE: 0
GASTROINTESTINAL NEGATIVE: 0
ALLERGIC/IMMUNOLOGIC NEGATIVE: 0
PSYCHIATRIC NEGATIVE: 0
CONSTITUTIONAL NEGATIVE: 0
RESPIRATORY NEGATIVE: 0
CARDIOVASCULAR NEGATIVE: 0
ENDOCRINE NEGATIVE: 0
NEUROLOGICAL NEGATIVE: 0
MUSCULOSKELETAL NEGATIVE: 0
EYES NEGATIVE: 0

## 2023-12-14 ASSESSMENT — CONF VISUAL FIELD
OD_INFERIOR_NASAL_RESTRICTION: 0
OD_INFERIOR_TEMPORAL_RESTRICTION: 0
OS_SUPERIOR_NASAL_RESTRICTION: 0
OD_SUPERIOR_TEMPORAL_RESTRICTION: 0
OS_SUPERIOR_TEMPORAL_RESTRICTION: 0
OS_NORMAL: 1
OS_INFERIOR_NASAL_RESTRICTION: 0
OS_INFERIOR_TEMPORAL_RESTRICTION: 0
OD_NORMAL: 1
OD_SUPERIOR_NASAL_RESTRICTION: 0

## 2023-12-14 ASSESSMENT — TONOMETRY
OD_IOP_MMHG: 9
IOP_METHOD: GOLDMANN APPLANATION
OS_IOP_MMHG: 9

## 2023-12-14 ASSESSMENT — VISUAL ACUITY
METHOD: SNELLEN - LINEAR
OS_SC: 20/40
OD_SC+: -2
OD_SC: 20/30

## 2023-12-14 ASSESSMENT — CUP TO DISC RATIO
OD_RATIO: 0.75
OS_RATIO: 0.75

## 2023-12-14 ASSESSMENT — EXTERNAL EXAM - LEFT EYE: OS_EXAM: DERMATOCHALASIS

## 2023-12-14 ASSESSMENT — EXTERNAL EXAM - RIGHT EYE: OD_EXAM: DERMATOCHALASIS

## 2023-12-14 NOTE — PROGRESS NOTES
primary open angle glaucoma,  moderate OS, mild OD   s/p express shunt OS with Dr. Valiente   tmax in our chart 24/25   IOP has been stable for several years on current regimen   HVF 24-2 (3/16/23): Nsd OU, persistent nasal changes OS, slightly worse from previous  OCT, Optic Nerve - OU - Both Eyes          Right Eye  Images reviewed and comparison made to baseline.     Left Eye  Images reviewed and comparison made to baseline.     Notes  Thinning OS>OD, both stable to 2019           IOP great OU and stable   ok to monitor   cpm of cosopt BID latan qhs OU   rtc 6 months for HVF 24-2 and IOP check      dry eye: OS>OD   has intermittent visual disturbance    continue atifiicial tears QID and PF AT PRN      Pseudophakia OU: mild PCO   Monitor

## 2023-12-20 DIAGNOSIS — N18.2 TYPE 2 DIABETES MELLITUS WITH STAGE 2 CHRONIC KIDNEY DISEASE AND HYPERTENSION (MULTI): Primary | ICD-10-CM

## 2023-12-20 DIAGNOSIS — E11.22 TYPE 2 DIABETES MELLITUS WITH STAGE 2 CHRONIC KIDNEY DISEASE AND HYPERTENSION (MULTI): Primary | ICD-10-CM

## 2023-12-20 DIAGNOSIS — I12.9 TYPE 2 DIABETES MELLITUS WITH STAGE 2 CHRONIC KIDNEY DISEASE AND HYPERTENSION (MULTI): Primary | ICD-10-CM

## 2023-12-22 ENCOUNTER — PHARMACY VISIT (OUTPATIENT)
Dept: PHARMACY | Facility: CLINIC | Age: 83
End: 2023-12-22
Payer: MEDICARE

## 2023-12-22 PROCEDURE — RXMED WILLOW AMBULATORY MEDICATION CHARGE

## 2023-12-28 RX ORDER — BLOOD-GLUCOSE METER
EACH MISCELLANEOUS
Qty: 1 EACH | Refills: 0 | Status: SHIPPED | OUTPATIENT
Start: 2023-12-28

## 2023-12-28 RX ORDER — CALCIUM CITRATE/VITAMIN D3 200MG-6.25
TABLET ORAL
Qty: 100 STRIP | Refills: 3 | Status: SHIPPED | OUTPATIENT
Start: 2023-12-28

## 2023-12-28 RX ORDER — LANCETS 33 GAUGE
EACH MISCELLANEOUS
Qty: 100 EACH | Refills: 3 | Status: SHIPPED | OUTPATIENT
Start: 2023-12-28

## 2023-12-28 RX ORDER — DEXTROSE 4 G
TABLET,CHEWABLE ORAL
Qty: 1 EACH | Refills: 0 | Status: SHIPPED | OUTPATIENT
Start: 2023-12-28

## 2023-12-28 RX ORDER — ISOPROPYL ALCOHOL 70 ML/100ML
SWAB TOPICAL
Qty: 100 EACH | Refills: 3 | Status: SHIPPED | OUTPATIENT
Start: 2023-12-28

## 2024-01-03 ENCOUNTER — OFFICE VISIT (OUTPATIENT)
Dept: GASTROENTEROLOGY | Facility: HOSPITAL | Age: 84
End: 2024-01-03
Payer: MEDICARE

## 2024-01-03 VITALS
TEMPERATURE: 97.7 F | SYSTOLIC BLOOD PRESSURE: 154 MMHG | WEIGHT: 101 LBS | BODY MASS INDEX: 19.07 KG/M2 | OXYGEN SATURATION: 100 % | DIASTOLIC BLOOD PRESSURE: 79 MMHG | HEART RATE: 80 BPM | HEIGHT: 61 IN

## 2024-01-03 DIAGNOSIS — K82.4 GALLBLADDER POLYP: ICD-10-CM

## 2024-01-03 DIAGNOSIS — K70.31 ALCOHOLIC CIRRHOSIS OF LIVER WITH ASCITES (MULTI): ICD-10-CM

## 2024-01-03 DIAGNOSIS — K70.9 ALCOHOLIC LIVER DISEASE (MULTI): Primary | ICD-10-CM

## 2024-01-03 PROCEDURE — 1126F AMNT PAIN NOTED NONE PRSNT: CPT | Performed by: INTERNAL MEDICINE

## 2024-01-03 PROCEDURE — 1159F MED LIST DOCD IN RCRD: CPT | Performed by: INTERNAL MEDICINE

## 2024-01-03 PROCEDURE — 99214 OFFICE O/P EST MOD 30 MIN: CPT | Performed by: INTERNAL MEDICINE

## 2024-01-03 PROCEDURE — 3077F SYST BP >= 140 MM HG: CPT | Performed by: INTERNAL MEDICINE

## 2024-01-03 PROCEDURE — 3078F DIAST BP <80 MM HG: CPT | Performed by: INTERNAL MEDICINE

## 2024-01-03 PROCEDURE — 1036F TOBACCO NON-USER: CPT | Performed by: INTERNAL MEDICINE

## 2024-01-03 SDOH — ECONOMIC STABILITY: FOOD INSECURITY: WITHIN THE PAST 12 MONTHS, YOU WORRIED THAT YOUR FOOD WOULD RUN OUT BEFORE YOU GOT MONEY TO BUY MORE.: NEVER TRUE

## 2024-01-03 SDOH — ECONOMIC STABILITY: FOOD INSECURITY: WITHIN THE PAST 12 MONTHS, THE FOOD YOU BOUGHT JUST DIDN'T LAST AND YOU DIDN'T HAVE MONEY TO GET MORE.: NEVER TRUE

## 2024-01-03 ASSESSMENT — PAIN SCALES - GENERAL: PAINLEVEL: 0-NO PAIN

## 2024-01-03 ASSESSMENT — COLUMBIA-SUICIDE SEVERITY RATING SCALE - C-SSRS
2. HAVE YOU ACTUALLY HAD ANY THOUGHTS OF KILLING YOURSELF?: NO
1. IN THE PAST MONTH, HAVE YOU WISHED YOU WERE DEAD OR WISHED YOU COULD GO TO SLEEP AND NOT WAKE UP?: NO
6. HAVE YOU EVER DONE ANYTHING, STARTED TO DO ANYTHING, OR PREPARED TO DO ANYTHING TO END YOUR LIFE?: NO

## 2024-01-03 ASSESSMENT — PATIENT HEALTH QUESTIONNAIRE - PHQ9
SUM OF ALL RESPONSES TO PHQ9 QUESTIONS 1 & 2: 0
1. LITTLE INTEREST OR PLEASURE IN DOING THINGS: NOT AT ALL
2. FEELING DOWN, DEPRESSED OR HOPELESS: NOT AT ALL

## 2024-01-03 ASSESSMENT — ENCOUNTER SYMPTOMS
OCCASIONAL FEELINGS OF UNSTEADINESS: 1
DEPRESSION: 0
LOSS OF SENSATION IN FEET: 1

## 2024-01-03 ASSESSMENT — LIFESTYLE VARIABLES
AUDIT-C TOTAL SCORE: 0
HOW MANY STANDARD DRINKS CONTAINING ALCOHOL DO YOU HAVE ON A TYPICAL DAY: PATIENT DOES NOT DRINK
HOW OFTEN DO YOU HAVE A DRINK CONTAINING ALCOHOL: NEVER
SKIP TO QUESTIONS 9-10: 1
HOW OFTEN DO YOU HAVE SIX OR MORE DRINKS ON ONE OCCASION: NEVER

## 2024-01-03 NOTE — PROGRESS NOTES
Subjective     Cirrhosis follow up visit.    History of Present Illness:   Yulia Fonseca is a 83 y.o. female who presents to GI/Liver clinic for alcohol related cirrhosis.    Accompanied by daughter today in the office.    Doing well on lactulose - takes once or twice per day.  Continues Rifaximin.  Also takes pantoprazole.    Otherwise no new issues.    Review of Systems  Review of Systems   All other systems reviewed and are negative.    Treated for an abscess on face in Sept / Oct.  2023.  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), Dietary counseling and surveillance (04/03/2019), Encounter for screening mammogram for malignant neoplasm of breast, Epistaxis (03/19/2018), Hepatic encephalopathy (CMS/HCC) (03/24/2017), Immunization not carried out because of patient refusal, Localized edema (02/10/2016), Other conditions influencing health status, Other specified abnormal findings of blood chemistry (12/08/2016), Other specified disorders of nose and nasal sinuses (08/26/2016), Other specified disorders of nose and nasal sinuses (01/09/2018), Other symptoms and signs concerning food and fluid intake (04/03/2019), Pain in right arm (09/11/2017), Pain in right toe(s) (08/26/2016), Personal history of diseases of the skin and subcutaneous tissue (01/09/2018), Personal history of other diseases of the circulatory system (10/26/2015), Personal history of other diseases of the digestive system (04/03/2019), Personal history of other diseases of the nervous system and sense organs (10/26/2015), Personal history of other diseases of the nervous system and sense organs, Personal history of other endocrine, nutritional and metabolic disease (09/29/2017), Personal history of other endocrine, nutritional and metabolic disease (01/17/2018), Personal history of other infectious and parasitic diseases, Personal history of other mental and  "behavioral disorders (03/24/2019), Personal history of other mental and behavioral disorders (02/04/2016), Personal history of other specified conditions (11/09/2018), Preglaucoma, unspecified, unspecified eye (08/26/2016), and Type 2 diabetes mellitus without complications (CMS/[facility]) (05/10/2021).     Social History   reports that she has quit smoking. Her smoking use included cigarettes. She has never been exposed to tobacco smoke. She has never used smokeless tobacco. She reports that she does not drink alcohol and does not use drugs.     Family History  family history includes Cancer in her sister; Coranary Arteriosclerosis in her father; Coronary artery disease in her father; Thromboenbolic Disease in her mother.     Allergies  No Known Allergies    Medications  Current Outpatient Medications   Medication Instructions    alcohol swabs (BD Alcohol Swabs) pads, medicated Use as directed daily for checking blood sugars    amLODIPine (NORVASC) 5 mg, oral, Daily    artificial tears, dextran-hypomel-glycerin, 0.1-0.3-0.2 % ophthalmic solution ophthalmic (eye), 4 times daily    BD Indu 2nd Gen Pen Needle 32 gauge x 5/32\" needle Use daily with insulin for DM    blood glucose control, low (True Metrix Level 1) solution Use as directed to check blood sugars    blood sugar diagnostic (OneTouch Verio test strips) strip miscellaneous    blood sugar diagnostic (OneTouch Verio test strips) strip Use to check BS twice daily    blood sugar diagnostic (True Metrix Glucose Test Strip) strip Use as directed daily to help check blood sugars    blood-glucose meter (True Metrix Air Glucose Meter) misc Use as directed daily to help check blood sugars    cetirizine (ZYRTEC) 10 mg, oral, Nightly    cholecalciferol (VITAMIN D-3) 50 mcg, oral, Daily    dextran 70-hypromellose drops 1-2 drops, ophthalmic (eye), 4 times daily    dorzolamide-timoloL (Cosopt) 22.3-6.8 mg/mL ophthalmic solution 1 drop, Both Eyes, 2 times daily    fluticasone " "(Flonase Allergy Relief) 50 mcg/actuation nasal spray nasal    insulin glargine (LANTUS SOLOSTAR U-100 INSULIN) 4 Units, subcutaneous, Daily    lactulose 10 gram/15 mL solution     lancets (TRUEplus Lancets) 33 gauge misc Use as directed daily to help check blood sugars    lancets misc Use to check BS twice daily    latanoprost (Xalatan) 0.005 % ophthalmic solution 1 drop, Both Eyes, Nightly    losartan (COZAAR) 25 mg, oral, Daily    multivitamin tablet 1 tablet, oral, Daily    mupirocin (Bactroban) 2 % ointment     pantoprazole (PROTONIX) 40 mg, oral, Daily before breakfast    rifAXIMin (Xifaxan) 550 mg tablet TAKE ONE (1) TABLET BY MOUTH TWICE DAILY.    triamcinolone (Kenalog) 0.1 % ointment 1 Application, Topical, 2 times daily, APPLY AND GENTLY MASSAGE INTO AFFECTED AREA(S) TWICE DAILY.        Objective   Visit Vitals  /79   Pulse 80   Temp 36.5 °C (97.7 °F)          9/26/2022    10:23 AM 11/30/2022     2:56 PM 6/28/2023     1:36 PM 7/18/2023    11:09 AM 7/18/2023    11:56 AM 10/5/2023    10:26 AM 1/3/2024     1:36 PM   Vitals   Systolic 106 148 157 132 120 122 154   Diastolic 62 76 71 62 70 60 79   Heart Rate 71  62 99  66 80   Temp 36.9 °C (98.4 °F) 35.5 °C (95.9 °F) 36.3 °C (97.3 °F) 36.1 °C (97 °F)  36.2 °C (97.2 °F) 36.5 °C (97.7 °F)   Resp    16  15    Height (in) 1.575 m (5' 2\")  1.575 m (5' 2\") 1.575 m (5' 2\")  1.575 m (5' 2\") 1.549 m (5' 1\")   Weight (lb) 113 111 104.56 104.6  106.3 101   BMI 20.67 kg/m2 20.3 kg/m2 19.12 kg/m2 19.13 kg/m2  19.44 kg/m2 19.08 kg/m2   BSA (m2) 1.5 m2 1.48 m2 1.44 m2 1.44 m2  1.45 m2 1.4 m2   Visit Report    Report Report Report Report     Physical Exam  Vitals reviewed.   Constitutional:       General: She is awake.   Cardiovascular:      Rate and Rhythm: Normal rate and regular rhythm.   Pulmonary:      Effort: Pulmonary effort is normal.      Breath sounds: Normal breath sounds.   Neurological:      Mental Status: She is alert and oriented to person, place, and " time.   Psychiatric:         Attention and Perception: Attention and perception normal.         Behavior: Behavior normal.         Labs    WBC   Date/Time Value Ref Range Status   07/18/2023 12:52 PM 4.7 4.4 - 11.3 x10E9/L Final     Hemoglobin   Date/Time Value Ref Range Status   07/18/2023 12:52 PM 12.3 12.0 - 16.0 g/dL Final     Hematocrit   Date/Time Value Ref Range Status   07/18/2023 12:52 PM 37.1 36.0 - 46.0 % Final     MCV   Date/Time Value Ref Range Status   07/18/2023 12:52 PM 84 80 - 100 fL Final     Platelets   Date/Time Value Ref Range Status   07/18/2023 12:52  150 - 450 x10E9/L Final        Total Protein   Date/Time Value Ref Range Status   07/18/2023 12:52 PM 8.4 (H) 6.4 - 8.2 g/dL Final     Albumin   Date/Time Value Ref Range Status   07/18/2023 12:52 PM 4.9 3.4 - 5.0 g/dL Final     AST   Date/Time Value Ref Range Status   07/18/2023 12:52 PM 42 (H) 9 - 39 U/L Final     ALT (SGPT)   Date/Time Value Ref Range Status   07/18/2023 12:52 PM 27 7 - 45 U/L Final     Comment:      Patients treated with Sulfasalazine may generate    falsely decreased results for ALT.     Alkaline Phosphatase   Date/Time Value Ref Range Status   07/18/2023 12:52  33 - 136 U/L Final     Total Bilirubin   Date/Time Value Ref Range Status   07/18/2023 12:52 PM 0.8 0.0 - 1.2 mg/dL Final     Bilirubin, Direct   Date/Time Value Ref Range Status   12/08/2022 01:37 PM 0.2 0.0 - 0.3 mg/dL Final        Vitamin D, 25-Hydroxy   Date/Time Value Ref Range Status   07/10/2020 10:33 AM 63 ng/mL Final     Comment:     .  DEFICIENCY:         < 20   NG/ML  INSUFFICIENCY:      20-29  NG/ML  SUFFICIENCY:         NG/ML    THIS ASSAY ACCURATELY QUANTIFIES THE SUM OF  VITAMIN D3, 25-HYDROXY AND VIT D2,25-HYDROXY.          AST   Date/Time Value Ref Range Status   07/18/2023 12:52 PM 42 (H) 9 - 39 U/L Final     ALT (SGPT)   Date/Time Value Ref Range Status   07/18/2023 12:52 PM 27 7 - 45 U/L Final     Comment:      Patients treated  with Sulfasalazine may generate    falsely decreased results for ALT.     Alkaline Phosphatase   Date/Time Value Ref Range Status   07/18/2023 12:52  33 - 136 U/L Final     Total Bilirubin   Date/Time Value Ref Range Status   07/18/2023 12:52 PM 0.8 0.0 - 1.2 mg/dL Final     Bilirubin, Direct   Date/Time Value Ref Range Status   12/08/2022 01:37 PM 0.2 0.0 - 0.3 mg/dL Final     Albumin   Date/Time Value Ref Range Status   07/18/2023 12:52 PM 4.9 3.4 - 5.0 g/dL Final     Total Protein   Date/Time Value Ref Range Status   07/18/2023 12:52 PM 8.4 (H) 6.4 - 8.2 g/dL Final        Protime   Date/Time Value Ref Range Status   07/18/2023 12:52 PM 12.5 9.8 - 12.8 sec Final     Comment:     Note new reference range as of 6/20/2023 at 10:00am.     INR   Date/Time Value Ref Range Status   07/18/2023 12:52 PM 1.1 0.9 - 1.1 Final       Assessment/Plan   Yulia Fonseca is a 83 y.o. female who presents to GI/Liver clinic for cirrhosis follow up.      Assessment:  84 y/o F with prior alcoholic hepatitis, and now stable alcohol related cirrhosis.  LIver disease is stable. She remains sober from all alcohol use. She is sober and has stopped all alcohol consumption for some time.      I had questioned whether she was actually cirrhotic (in the past). Her Fibroscan results (46 kPa) supports cirrhosis and portal hypertension.   Prior EGD several years ago with small varices indicative of portal HTN as well.     She has had some issues with encephalopathy, taking lactulose and rifaximin. She is currently doing well on both medications with no further episodes of HE. We will continue Generlac - which she prefers.     Tubular adenoma found on colonoscopy in 2016.     Discussed the risks and benefits of repeating an EGD (> 3 years since last EGD) and after consideration the patient has declined a follow-up endoscopy.     Gallbladder polyps and the pancreatic duct stones noted on recent liver US. Will continue surveillance.     Recs:  -  Continue lactulose. Rifaximin..  - HCC screening with U/S q6 months, ordered. Will also check on GB polyps.  - We discussed variceal surveillance - and decided not to continue endoscopic surveillance. We also discussed surveillance for colon polyps, and have decided not to continue with surveillance colonoscopies. Which are both appropriate to hold off on due to age.   - IF patient needs additional medications for HTN would recommend nonselective betablocker at that time     I had recommended the hepatitis B vaccine series in the past and she has declined.  She is otherwise immune to hepatitis A.     She will return in 6 months.   Labwork in 6 months, liver US now. Continue current medications.       Instructions      Peter Huston MD

## 2024-01-03 NOTE — PATIENT INSTRUCTIONS
Welcome to Dr. Peter Huston[facility].  Dr. Huston sees patients at the following sites:  [place] [address] [facility] at [facility]  [address], [address] at [facility] at [facility], [facility] [address]    Dr. Huston's hepatology care coordinator, Rajani SUAZO, can be reached at [phone].  Dr. Huston's , Socorro Rivera, can be reached at [phone].     Get blood work today.     Get a Liver Ultrasound for liver cancer surveillance.  Do not eat or drink anything 6 hours prior to your exam.  Call [phone] to schedule.     See me back in clinic in 6 months. Call around April, or May to schedule your visit for July 2024.  [phone].

## 2024-01-08 ENCOUNTER — LAB (OUTPATIENT)
Dept: LAB | Facility: LAB | Age: 84
End: 2024-01-08
Payer: MEDICARE

## 2024-01-08 ENCOUNTER — OFFICE VISIT (OUTPATIENT)
Dept: PRIMARY CARE | Facility: CLINIC | Age: 84
End: 2024-01-08
Payer: MEDICARE

## 2024-01-08 VITALS
HEART RATE: 64 BPM | OXYGEN SATURATION: 98 % | DIASTOLIC BLOOD PRESSURE: 65 MMHG | SYSTOLIC BLOOD PRESSURE: 169 MMHG | WEIGHT: 102.9 LBS | BODY MASS INDEX: 19.44 KG/M2

## 2024-01-08 DIAGNOSIS — N18.30 SECONDARY DM WITH CKD STAGE 3 AND HYPERTENSION (MULTI): ICD-10-CM

## 2024-01-08 DIAGNOSIS — K70.9 ALCOHOLIC LIVER DISEASE (MULTI): ICD-10-CM

## 2024-01-08 DIAGNOSIS — I12.9 SECONDARY DM WITH CKD STAGE 3 AND HYPERTENSION (MULTI): ICD-10-CM

## 2024-01-08 DIAGNOSIS — I85.00 ESOPHAGEAL VARICES WITHOUT BLEEDING, UNSPECIFIED ESOPHAGEAL VARICES TYPE (MULTI): ICD-10-CM

## 2024-01-08 DIAGNOSIS — R26.2 DIFFICULTY IN WALKING, NOT ELSEWHERE CLASSIFIED: Primary | ICD-10-CM

## 2024-01-08 DIAGNOSIS — I10 BENIGN ESSENTIAL HYPERTENSION: ICD-10-CM

## 2024-01-08 DIAGNOSIS — E46 PROTEIN-CALORIE MALNUTRITION, UNSPECIFIED SEVERITY (MULTI): ICD-10-CM

## 2024-01-08 DIAGNOSIS — K70.31 ALCOHOLIC CIRRHOSIS OF LIVER WITH ASCITES (MULTI): ICD-10-CM

## 2024-01-08 DIAGNOSIS — I73.9 PAD (PERIPHERAL ARTERY DISEASE) (CMS-HCC): ICD-10-CM

## 2024-01-08 DIAGNOSIS — E11.42 DM TYPE 2 WITH DIABETIC PERIPHERAL NEUROPATHY (MULTI): ICD-10-CM

## 2024-01-08 DIAGNOSIS — E13.22 SECONDARY DM WITH CKD STAGE 3 AND HYPERTENSION (MULTI): ICD-10-CM

## 2024-01-08 DIAGNOSIS — L73.9 FOLLICULITIS: ICD-10-CM

## 2024-01-08 LAB
AFP SERPL-MCNC: 11 NG/ML (ref 0–9)
ALBUMIN SERPL BCP-MCNC: 4.7 G/DL (ref 3.4–5)
ALP SERPL-CCNC: 121 U/L (ref 33–136)
ALT SERPL W P-5'-P-CCNC: 31 U/L (ref 7–45)
ANION GAP SERPL CALC-SCNC: 14 MMOL/L (ref 10–20)
AST SERPL W P-5'-P-CCNC: 41 U/L (ref 9–39)
BASOPHILS # BLD AUTO: 0.03 X10*3/UL (ref 0–0.1)
BASOPHILS NFR BLD AUTO: 0.7 %
BILIRUB DIRECT SERPL-MCNC: 0.2 MG/DL (ref 0–0.3)
BILIRUB SERPL-MCNC: 0.8 MG/DL (ref 0–1.2)
BUN SERPL-MCNC: 17 MG/DL (ref 6–23)
CALCIUM SERPL-MCNC: 10.6 MG/DL (ref 8.6–10.6)
CHLORIDE SERPL-SCNC: 112 MMOL/L (ref 98–107)
CHOLEST SERPL-MCNC: 159 MG/DL (ref 0–199)
CHOLESTEROL/HDL RATIO: 2.6
CO2 SERPL-SCNC: 24 MMOL/L (ref 21–32)
CREAT SERPL-MCNC: 1.22 MG/DL (ref 0.5–1.05)
EGFRCR SERPLBLD CKD-EPI 2021: 44 ML/MIN/1.73M*2
EOSINOPHIL # BLD AUTO: 0.13 X10*3/UL (ref 0–0.4)
EOSINOPHIL NFR BLD AUTO: 2.9 %
ERYTHROCYTE [DISTWIDTH] IN BLOOD BY AUTOMATED COUNT: 13.9 % (ref 11.5–14.5)
EST. AVERAGE GLUCOSE BLD GHB EST-MCNC: 146 MG/DL
GLUCOSE SERPL-MCNC: 148 MG/DL (ref 74–99)
HBA1C MFR BLD: 6.7 %
HCT VFR BLD AUTO: 39.4 % (ref 36–46)
HDLC SERPL-MCNC: 61.1 MG/DL
HGB BLD-MCNC: 12.3 G/DL (ref 12–16)
IMM GRANULOCYTES # BLD AUTO: 0.01 X10*3/UL (ref 0–0.5)
IMM GRANULOCYTES NFR BLD AUTO: 0.2 % (ref 0–0.9)
INR PPP: 1.2 (ref 0.9–1.1)
LDLC SERPL CALC-MCNC: 84 MG/DL
LYMPHOCYTES # BLD AUTO: 1.06 X10*3/UL (ref 0.8–3)
LYMPHOCYTES NFR BLD AUTO: 24 %
MCH RBC QN AUTO: 27.4 PG (ref 26–34)
MCHC RBC AUTO-ENTMCNC: 31.2 G/DL (ref 32–36)
MCV RBC AUTO: 88 FL (ref 80–100)
MONOCYTES # BLD AUTO: 0.36 X10*3/UL (ref 0.05–0.8)
MONOCYTES NFR BLD AUTO: 8.1 %
NEUTROPHILS # BLD AUTO: 2.83 X10*3/UL (ref 1.6–5.5)
NEUTROPHILS NFR BLD AUTO: 64.1 %
NON HDL CHOLESTEROL: 98 MG/DL (ref 0–149)
NRBC BLD-RTO: 0 /100 WBCS (ref 0–0)
PLATELET # BLD AUTO: 147 X10*3/UL (ref 150–450)
POTASSIUM SERPL-SCNC: 5.2 MMOL/L (ref 3.5–5.3)
PROT SERPL-MCNC: 8.2 G/DL (ref 6.4–8.2)
PROTHROMBIN TIME: 13.1 SECONDS (ref 9.8–12.8)
RBC # BLD AUTO: 4.49 X10*6/UL (ref 4–5.2)
SODIUM SERPL-SCNC: 145 MMOL/L (ref 136–145)
TRIGL SERPL-MCNC: 69 MG/DL (ref 0–149)
VLDL: 14 MG/DL (ref 0–40)
WBC # BLD AUTO: 4.4 X10*3/UL (ref 4.4–11.3)

## 2024-01-08 PROCEDURE — 85610 PROTHROMBIN TIME: CPT

## 2024-01-08 PROCEDURE — 82248 BILIRUBIN DIRECT: CPT

## 2024-01-08 PROCEDURE — 1159F MED LIST DOCD IN RCRD: CPT | Performed by: FAMILY MEDICINE

## 2024-01-08 PROCEDURE — 1126F AMNT PAIN NOTED NONE PRSNT: CPT | Performed by: FAMILY MEDICINE

## 2024-01-08 PROCEDURE — 80061 LIPID PANEL: CPT

## 2024-01-08 PROCEDURE — 1036F TOBACCO NON-USER: CPT | Performed by: FAMILY MEDICINE

## 2024-01-08 PROCEDURE — 1160F RVW MEDS BY RX/DR IN RCRD: CPT | Performed by: FAMILY MEDICINE

## 2024-01-08 PROCEDURE — 80053 COMPREHEN METABOLIC PANEL: CPT

## 2024-01-08 PROCEDURE — 3078F DIAST BP <80 MM HG: CPT | Performed by: FAMILY MEDICINE

## 2024-01-08 PROCEDURE — 99214 OFFICE O/P EST MOD 30 MIN: CPT | Performed by: FAMILY MEDICINE

## 2024-01-08 PROCEDURE — 3077F SYST BP >= 140 MM HG: CPT | Performed by: FAMILY MEDICINE

## 2024-01-08 PROCEDURE — 82105 ALPHA-FETOPROTEIN SERUM: CPT

## 2024-01-08 PROCEDURE — 36415 COLL VENOUS BLD VENIPUNCTURE: CPT

## 2024-01-08 PROCEDURE — 85025 COMPLETE CBC W/AUTO DIFF WBC: CPT

## 2024-01-08 PROCEDURE — 83036 HEMOGLOBIN GLYCOSYLATED A1C: CPT

## 2024-01-08 RX ORDER — AMLODIPINE BESYLATE 10 MG/1
10 TABLET ORAL DAILY
Qty: 90 TABLET | Refills: 0 | Status: SHIPPED | OUTPATIENT
Start: 2024-01-08 | End: 2024-03-20 | Stop reason: SDUPTHER

## 2024-01-08 RX ORDER — FLASH GLUCOSE SENSOR
KIT MISCELLANEOUS
Qty: 2 EACH | Refills: 11 | Status: SHIPPED | OUTPATIENT
Start: 2024-01-08

## 2024-01-08 RX ORDER — CLINDAMYCIN PHOSPHATE 10 UG/ML
LOTION TOPICAL 2 TIMES DAILY
Qty: 60 ML | Refills: 1 | Status: SHIPPED | OUTPATIENT
Start: 2024-01-08 | End: 2025-01-07

## 2024-01-08 NOTE — PATIENT INSTRUCTIONS
Diagnoses and all orders for this visit:  Difficulty in walking, not elsewhere classified (Primary)  - continue with cane  - has rta paratrans  -     gave Disability Placard  Folliculitis  -    start clindamycin (Cleocin T) 1 % lotion; Apply topically 2 times a day.  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 Increase amlodipine to 10mg  DM- last hba1c 6.5, checking again  CKD- stbale, continue to monitor  Esophageal varices without bleeding, unspecified esophageal varices type (CMS/[facility])  - followed by GI team- stable  Protein-calorie malnutrition, unspecified severity (CMS/[facility])  - continue 3 meal daily  PAD (peripheral artery disease) (CMS/[facility])  -    stable, cont to monitor   Lipid Panel; Future  DM type 2 with diabetic peripheral neuropathy (CMS/[facility])  -     Hemoglobin A1C; Future  -     Lipid Panel; Future  - neuropathy- not to bad- declined meds,let me know if you change your mind  -Would like to use CGM- would benefit to help with BS control  - Last hba1c 6.5-rechecking today    Please follow up in 2wks for BP check with nurse clinic and in [place]UL[place] as scheduled for medicare wellness or as needed.       ** If labs or imaging ordered at today's visit, all the non-urgent results will be discussed at your next visit    If you have been referred for a special test or to a specialist please call  [phone] to schedule an appointment.  If you have any further questions, or if develop new or worsened symptoms, please give our office a call at [phone].

## 2024-01-08 NOTE — PROGRESS NOTES
Subjective   Patient ID: Yulia Fonseca is a 83 y.o. female who presents for No chief complaint on file..    HPI     Followed by hepatology- ordered liver ultrasound  Doing well on lactulose - takes once or twice per day.  Continues Rifaximin.  Also takes pantoprazole.    HTN  BP at goal today in office.  Using medications without issues.  Denies CP, SOB, palpitations, change in vision, dizziness, N/V.    DM  Using 4units of insulin(lantus) daily  States BS ( 120's in the am and at bedtime 160's)  Sometimes BS in the 80's  Would like to use CGM- would benefit to help withBS control  Last hba1c 6.5-rechecking today      Review of Systems  All systems reviewed and neg if not noted in the HPI above     Objective   /65 (BP Location: Left arm)   Pulse 64   Wt 46.7 kg (102 lb 14.4 oz)   SpO2 98%   BMI 19.44 kg/m²     Physical Exam  Constitutional: Well-nourished sitting on chair  Eyes: eye lids are without obvious rash or drooping.   Ears, Nose, Mouth, and Throat:  appear to be without deformity or rash. No lesions or masses noted. Hearing is grossly intact.   Respiratory: No gasping or shortness of breath noted, no use of accessory muscles noted.   Skin: No obvious rashes or lesions  identified on skin.   Psychiatric: Alert, orientation to person, place, and time. Recent/remote memory as evidenced through face-to-face interaction and discussion appear grossly intact.   Mood and affect are normal.     Assessment/Plan   Diagnoses and all orders for this visit:  Difficulty in walking, not elsewhere classified (Primary)  - continue with cane  - has rta paratrans  -     Disability Placard  Folliculitis  -    start clindamycin (Cleocin T) 1 % lotion; Apply topically 2 times a day.  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 Increase amlodipine to 10mg  DM- last hba1c 6.5, checking again with labs-  ordered Freestyle supriya- would benefit for using this for better bs control   CKD- stbale, continue to monitor  Esophageal varices without bleeding, unspecified esophageal varices type (CMS/HCC)  - followed by GI team- stable  Protein-calorie malnutrition, unspecified severity (CMS/HCC)  - continue 3 meal daily  PAD (peripheral artery disease) (CMS/HCC)  -    stable, cont to monitor   Lipid Panel; Future  DM type 2 with diabetic peripheral neuropathy (CMS/[facility])  -     Hemoglobin A1C; Future  -     Lipid Panel; Future  - neuropathy- not to bad- declined meds,let me know if you change your mind  -Would like to use CGM- would benefit to help with BS control  - Last hba1c 6.5-rechecking today    Please follow up in 2wks for BP check with nurse clinic and in JULY as scheduled for medicare wellness or as needed.

## 2024-01-17 ENCOUNTER — ANCILLARY PROCEDURE (OUTPATIENT)
Dept: RADIOLOGY | Facility: CLINIC | Age: 84
End: 2024-01-17
Payer: MEDICARE

## 2024-01-17 DIAGNOSIS — K70.9 ALCOHOLIC LIVER DISEASE (MULTI): ICD-10-CM

## 2024-01-17 DIAGNOSIS — K70.31 ALCOHOLIC CIRRHOSIS OF LIVER WITH ASCITES (MULTI): ICD-10-CM

## 2024-01-17 PROCEDURE — 76705 ECHO EXAM OF ABDOMEN: CPT | Performed by: RADIOLOGY

## 2024-01-17 PROCEDURE — 76705 ECHO EXAM OF ABDOMEN: CPT

## 2024-01-20 PROCEDURE — RXMED WILLOW AMBULATORY MEDICATION CHARGE

## 2024-01-23 DIAGNOSIS — K70.9 ALCOHOLIC LIVER DISEASE (MULTI): Primary | ICD-10-CM

## 2024-01-24 ENCOUNTER — OFFICE VISIT (OUTPATIENT)
Dept: PRIMARY CARE | Facility: CLINIC | Age: 84
End: 2024-01-24
Payer: MEDICARE

## 2024-01-24 ENCOUNTER — PHARMACY VISIT (OUTPATIENT)
Dept: PHARMACY | Facility: CLINIC | Age: 84
End: 2024-01-24
Payer: COMMERCIAL

## 2024-01-24 VITALS
RESPIRATION RATE: 14 BRPM | DIASTOLIC BLOOD PRESSURE: 60 MMHG | HEART RATE: 65 BPM | OXYGEN SATURATION: 100 % | HEIGHT: 61 IN | TEMPERATURE: 96.6 F | SYSTOLIC BLOOD PRESSURE: 120 MMHG | WEIGHT: 108.1 LBS | BODY MASS INDEX: 20.41 KG/M2

## 2024-01-24 DIAGNOSIS — E11.9 TYPE 2 DIABETES MELLITUS WITHOUT COMPLICATION, WITH LONG-TERM CURRENT USE OF INSULIN (MULTI): Primary | ICD-10-CM

## 2024-01-24 DIAGNOSIS — Z79.4 TYPE 2 DIABETES MELLITUS WITHOUT COMPLICATION, WITH LONG-TERM CURRENT USE OF INSULIN (MULTI): Primary | ICD-10-CM

## 2024-01-24 DIAGNOSIS — I10 BENIGN ESSENTIAL HYPERTENSION: ICD-10-CM

## 2024-01-24 PROBLEM — Z86.79 HISTORY OF HYPERTENSION: Status: ACTIVE | Noted: 2024-01-24

## 2024-01-24 PROBLEM — R63.4 WEIGHT LOSS: Status: ACTIVE | Noted: 2024-01-24

## 2024-01-24 PROBLEM — Z86.69 HISTORY OF CATARACT: Status: ACTIVE | Noted: 2024-01-24

## 2024-01-24 PROBLEM — H43.819 POSTERIOR VITREOUS DETACHMENT: Status: ACTIVE | Noted: 2024-01-24

## 2024-01-24 PROBLEM — L73.9 FOLLICULITIS: Status: ACTIVE | Noted: 2024-01-24

## 2024-01-24 PROBLEM — R60.0 EDEMA OF LOWER EXTREMITY: Status: ACTIVE | Noted: 2024-01-24

## 2024-01-24 PROBLEM — F10.11 HISTORY OF ALCOHOL ABUSE: Status: ACTIVE | Noted: 2024-01-24

## 2024-01-24 PROBLEM — B17.9 ACUTE HEPATITIS: Status: ACTIVE | Noted: 2024-01-24

## 2024-01-24 PROBLEM — F32.A DEPRESSIVE DISORDER: Status: ACTIVE | Noted: 2024-01-24

## 2024-01-24 PROCEDURE — 1036F TOBACCO NON-USER: CPT | Performed by: FAMILY MEDICINE

## 2024-01-24 PROCEDURE — 3078F DIAST BP <80 MM HG: CPT | Performed by: FAMILY MEDICINE

## 2024-01-24 PROCEDURE — 1126F AMNT PAIN NOTED NONE PRSNT: CPT | Performed by: FAMILY MEDICINE

## 2024-01-24 PROCEDURE — 1159F MED LIST DOCD IN RCRD: CPT | Performed by: FAMILY MEDICINE

## 2024-01-24 PROCEDURE — 3074F SYST BP LT 130 MM HG: CPT | Performed by: FAMILY MEDICINE

## 2024-01-24 PROCEDURE — 99213 OFFICE O/P EST LOW 20 MIN: CPT | Performed by: FAMILY MEDICINE

## 2024-01-24 PROCEDURE — 1160F RVW MEDS BY RX/DR IN RCRD: CPT | Performed by: FAMILY MEDICINE

## 2024-01-24 RX ORDER — LACTULOSE 10 G/15ML
SOLUTION ORAL; RECTAL
Status: CANCELLED | OUTPATIENT
Start: 2024-01-24

## 2024-01-24 RX ORDER — MIRTAZAPINE 7.5 MG/1
TABLET, FILM COATED ORAL
COMMUNITY
Start: 2019-03-24

## 2024-01-24 RX ORDER — CALCIUM CARBONATE 500(1250)
TABLET ORAL
COMMUNITY

## 2024-01-24 RX ORDER — BLOOD-GLUCOSE,RECEIVER,CONT
EACH MISCELLANEOUS
Qty: 1 EACH | Refills: 0 | Status: SHIPPED | OUTPATIENT
Start: 2024-01-24

## 2024-01-24 RX ORDER — BLOOD-GLUCOSE SENSOR
EACH MISCELLANEOUS
Qty: 3 EACH | Refills: 11 | Status: SHIPPED | OUTPATIENT
Start: 2024-01-24

## 2024-01-24 ASSESSMENT — PATIENT HEALTH QUESTIONNAIRE - PHQ9
2. FEELING DOWN, DEPRESSED OR HOPELESS: NOT AT ALL
SUM OF ALL RESPONSES TO PHQ9 QUESTIONS 1 AND 2: 0
1. LITTLE INTEREST OR PLEASURE IN DOING THINGS: NOT AT ALL

## 2024-01-24 NOTE — PROGRESS NOTES
"Subjective   Patient ID: Yulia Fonseca is a 83 y.o. female who presents for PPD Read (Follow up BP).    HPI     Followed by hepatology  Doing well on lactulose - takes once or twice per day.  Continues Rifaximin.  Also takes pantoprazole.     HTN  BP at goal today in office.  Using medications without issues.  Denies CP, SOB, palpitations, change in vision, dizziness, N/V.     DM  Using 4units of insulin(lantus) daily  States BS ( 120's in the am and at bedtime 160's)  Sometimes BS in the 80's  Would like to use CGM- would benefit to help withBS control ( ordered in the past- was not able to get)  Last hba1c 6.7   States eat cy crackers when feeling low       Review of Systems  All systems reviewed and neg if not noted in the HPI above       Objective   /60 (Patient Position: Sitting)   Pulse 65   Temp 35.9 °C (96.6 °F)   Resp 14   Ht 1.549 m (5' 1\")   Wt 49 kg (108 lb 1.6 oz)   SpO2 100%   BMI 20.43 kg/m²     Physical Exam  Pleasant  Eyes: conjunctiva non-icteric and eye lids are without obvious rash or drooping. Pupils are symmetric.   Ears, Nose, Mouth, and Throat: External ears and nose appear to be without deformity or rash. No lesions or masses noted. Hearing is grossly intact.   CV: RRR, no murmur  Pulm:CTA B/L  Abd: soft, NTTP, + BS  Psychiatric: Alert, orientation to person, place, and time. Recent/remote memory as evidenced through face-to-face interaction and discussion appear grossly intact. Mood and affect are normal.     Assessment/Plan   Problem List Items Addressed This Visit             ICD-10-CM    Benign essential hypertension  HTN  - Your blood pressure is at goal today- goal is less than 130/80  - Continue your current medications  - Weight loss can help lower your bp!  Work on a healthy whole food diet and add at least 30min of exercise 5 days per week  - Work on a low salt diet   I10    T2DM (type 2 diabetes mellitus) (CMS/HCC) - Primary E11.9    Relevant Medications    - would " benefit from CGM  - blood-glucose sensor (Dexcom G7 Sensor) device    - Dexcom G4 platinum  (Dexcom G7 ) misc       Please follow up in  as scheduled in JULY or as needed.

## 2024-01-24 NOTE — PATIENT INSTRUCTIONS
Ordered the meter (continuous)  - Let me know if they do not call you in 1 wk    HTN  - Your blood pressure is at goal today- goal is less than 130/80  - Continue your current medications  - Weight loss can help lower your bp!  Work on a healthy whole food diet and add at least 30min of exercise 5 days per week  - Work on a low salt diet      Please follow up in  as scheduled in JULY or as needed.       ** If labs or imaging ordered at today's visit, all the non-urgent results will be discussed at your next visit    If you have been referred for a special test or to a specialist please call  [phone] to schedule an appointment.  If you have any further questions, or if develop new or worsened symptoms, please give our office a call at [phone].

## 2024-01-26 RX ORDER — LACTULOSE 10 G/15ML
20 SOLUTION ORAL; RECTAL 2 TIMES DAILY
Qty: 1800 ML | Refills: 0 | Status: SHIPPED | OUTPATIENT
Start: 2024-01-26 | End: 2024-02-25

## 2024-02-20 ENCOUNTER — SPECIALTY PHARMACY (OUTPATIENT)
Dept: PHARMACY | Facility: CLINIC | Age: 84
End: 2024-02-20

## 2024-02-22 ENCOUNTER — SPECIALTY PHARMACY (OUTPATIENT)
Dept: PHARMACY | Facility: CLINIC | Age: 84
End: 2024-02-22

## 2024-02-23 ENCOUNTER — PHARMACY VISIT (OUTPATIENT)
Dept: PHARMACY | Facility: CLINIC | Age: 84
End: 2024-02-23
Payer: COMMERCIAL

## 2024-02-23 PROCEDURE — RXMED WILLOW AMBULATORY MEDICATION CHARGE

## 2024-03-18 ENCOUNTER — PHARMACY VISIT (OUTPATIENT)
Dept: PHARMACY | Facility: CLINIC | Age: 84
End: 2024-03-18
Payer: COMMERCIAL

## 2024-03-18 PROCEDURE — RXMED WILLOW AMBULATORY MEDICATION CHARGE

## 2024-03-20 DIAGNOSIS — K21.9 CHRONIC GERD: ICD-10-CM

## 2024-03-20 DIAGNOSIS — Z79.4 TYPE 2 DIABETES MELLITUS WITH HYPERGLYCEMIA, WITH LONG-TERM CURRENT USE OF INSULIN (MULTI): Primary | ICD-10-CM

## 2024-03-20 DIAGNOSIS — I10 BENIGN ESSENTIAL HYPERTENSION: ICD-10-CM

## 2024-03-20 DIAGNOSIS — E11.9 DIABETES MELLITUS TYPE 2 WITHOUT RETINOPATHY (MULTI): ICD-10-CM

## 2024-03-20 DIAGNOSIS — E11.65 TYPE 2 DIABETES MELLITUS WITH HYPERGLYCEMIA, WITH LONG-TERM CURRENT USE OF INSULIN (MULTI): Primary | ICD-10-CM

## 2024-03-21 RX ORDER — AMLODIPINE BESYLATE 10 MG/1
10 TABLET ORAL DAILY
Qty: 90 TABLET | Refills: 3 | Status: SHIPPED | OUTPATIENT
Start: 2024-03-21

## 2024-03-21 RX ORDER — BLOOD-GLUCOSE METER
EACH MISCELLANEOUS
Qty: 200 STRIP | Refills: 3 | Status: SHIPPED | OUTPATIENT
Start: 2024-03-21

## 2024-03-21 RX ORDER — INSULIN GLARGINE 100 [IU]/ML
4 INJECTION, SOLUTION SUBCUTANEOUS DAILY
Qty: 15 ML | Refills: 3 | Status: SHIPPED | OUTPATIENT
Start: 2024-03-21

## 2024-03-21 RX ORDER — LOSARTAN POTASSIUM 25 MG/1
25 TABLET ORAL DAILY
Qty: 90 TABLET | Refills: 3 | Status: SHIPPED | OUTPATIENT
Start: 2024-03-21

## 2024-03-21 RX ORDER — PANTOPRAZOLE SODIUM 40 MG/1
40 TABLET, DELAYED RELEASE ORAL
Qty: 90 TABLET | Refills: 0 | Status: SHIPPED | OUTPATIENT
Start: 2024-03-21 | End: 2024-04-02 | Stop reason: SDUPTHER

## 2024-03-28 ENCOUNTER — TELEPHONE (OUTPATIENT)
Dept: GASTROENTEROLOGY | Facility: HOSPITAL | Age: 84
End: 2024-03-28
Payer: MEDICARE

## 2024-04-02 DIAGNOSIS — K70.31 ALCOHOLIC CIRRHOSIS OF LIVER WITH ASCITES (MULTI): ICD-10-CM

## 2024-04-02 DIAGNOSIS — K21.9 CHRONIC GERD: ICD-10-CM

## 2024-04-02 DIAGNOSIS — K82.4 GALLBLADDER POLYP: ICD-10-CM

## 2024-04-02 DIAGNOSIS — K70.31 ALCOHOLIC CIRRHOSIS OF LIVER WITH ASCITES (MULTI): Primary | ICD-10-CM

## 2024-04-02 RX ORDER — PANTOPRAZOLE SODIUM 40 MG/1
40 TABLET, DELAYED RELEASE ORAL
Qty: 90 TABLET | Refills: 1 | Status: SHIPPED | OUTPATIENT
Start: 2024-04-02 | End: 2024-09-29

## 2024-04-02 RX ORDER — LACTULOSE 10 G/15ML
30 SOLUTION ORAL DAILY
Qty: 900 ML | Refills: 11 | Status: SHIPPED | OUTPATIENT
Start: 2024-04-02 | End: 2025-04-02

## 2024-04-02 NOTE — TELEPHONE ENCOUNTER
Hepatology Nurse Coordinator Note  Spoke with patient. Made her aware Dr. Huston sent her refill for lactulose to her pharmacy. Patient aware to follow up with her pharmacy for refill. She verbalized understanding.

## 2024-04-02 NOTE — RESULT ENCOUNTER NOTE
I'd like her to repeat a US of liver and gallbladder with attention to GB polyps.  I will refill lactulose.

## 2024-04-05 ENCOUNTER — TELEPHONE (OUTPATIENT)
Dept: PRIMARY CARE | Facility: CLINIC | Age: 84
End: 2024-04-05
Payer: MEDICARE

## 2024-04-05 NOTE — TELEPHONE ENCOUNTER
Last seen 01/24/24.  Rx refill.  blood sugar diagnostic (OneTouch Verio test strips) strip   [id number] - [place], [place]     Phone: [phone]   Fax: [phone]

## 2024-04-16 PROCEDURE — RXMED WILLOW AMBULATORY MEDICATION CHARGE

## 2024-04-17 ENCOUNTER — PHARMACY VISIT (OUTPATIENT)
Dept: PHARMACY | Facility: CLINIC | Age: 84
End: 2024-04-17
Payer: COMMERCIAL

## 2024-05-16 PROCEDURE — RXMED WILLOW AMBULATORY MEDICATION CHARGE

## 2024-05-20 ENCOUNTER — PHARMACY VISIT (OUTPATIENT)
Dept: PHARMACY | Facility: CLINIC | Age: 84
End: 2024-05-20
Payer: COMMERCIAL

## 2024-05-22 ENCOUNTER — HOSPITAL ENCOUNTER (OUTPATIENT)
Dept: RADIOLOGY | Facility: CLINIC | Age: 84
Discharge: HOME | End: 2024-05-22
Payer: MEDICARE

## 2024-05-22 DIAGNOSIS — K70.31 ALCOHOLIC CIRRHOSIS OF LIVER WITH ASCITES (MULTI): ICD-10-CM

## 2024-05-22 DIAGNOSIS — K82.4 GALLBLADDER POLYP: ICD-10-CM

## 2024-05-22 PROCEDURE — 76705 ECHO EXAM OF ABDOMEN: CPT

## 2024-05-22 PROCEDURE — 76705 ECHO EXAM OF ABDOMEN: CPT | Performed by: RADIOLOGY

## 2024-05-24 DIAGNOSIS — K82.4 GALLBLADDER POLYP: ICD-10-CM

## 2024-06-03 ENCOUNTER — TUMOR BOARD CONFERENCE (OUTPATIENT)
Dept: HEMATOLOGY/ONCOLOGY | Facility: HOSPITAL | Age: 84
End: 2024-06-03
Payer: MEDICARE

## 2024-06-04 DIAGNOSIS — H40.1131 CHRONIC OPEN ANGLE GLAUCOMA OF BOTH EYES, MILD STAGE: ICD-10-CM

## 2024-06-04 RX ORDER — LATANOPROST 50 UG/ML
1 SOLUTION/ DROPS OPHTHALMIC NIGHTLY
Qty: 7.5 ML | Refills: 3 | Status: SHIPPED | OUTPATIENT
Start: 2024-06-04 | End: 2025-06-04

## 2024-06-04 RX ORDER — DORZOLAMIDE HYDROCHLORIDE AND TIMOLOL MALEATE 20; 5 MG/ML; MG/ML
1 SOLUTION/ DROPS OPHTHALMIC 2 TIMES DAILY
Qty: 30 ML | Refills: 3 | Status: SHIPPED | OUTPATIENT
Start: 2024-06-04 | End: 2024-06-07

## 2024-06-05 DIAGNOSIS — H40.1131 CHRONIC OPEN ANGLE GLAUCOMA OF BOTH EYES, MILD STAGE: ICD-10-CM

## 2024-06-07 RX ORDER — DORZOLAMIDE HYDROCHLORIDE AND TIMOLOL MALEATE 20; 5 MG/ML; MG/ML
1 SOLUTION/ DROPS OPHTHALMIC 2 TIMES DAILY
Qty: 30 ML | Refills: 3 | Status: SHIPPED | OUTPATIENT
Start: 2024-06-07 | End: 2025-06-07

## 2024-06-10 DIAGNOSIS — I10 BENIGN ESSENTIAL HYPERTENSION: ICD-10-CM

## 2024-06-10 DIAGNOSIS — E11.65 TYPE 2 DIABETES MELLITUS WITH HYPERGLYCEMIA, WITH LONG-TERM CURRENT USE OF INSULIN (MULTI): ICD-10-CM

## 2024-06-10 DIAGNOSIS — Z79.4 TYPE 2 DIABETES MELLITUS WITH HYPERGLYCEMIA, WITH LONG-TERM CURRENT USE OF INSULIN (MULTI): ICD-10-CM

## 2024-06-10 DIAGNOSIS — K21.9 CHRONIC GERD: ICD-10-CM

## 2024-06-12 DIAGNOSIS — K70.31 ALCOHOLIC CIRRHOSIS OF LIVER WITH ASCITES (MULTI): ICD-10-CM

## 2024-06-17 ENCOUNTER — TELEPHONE (OUTPATIENT)
Dept: GASTROENTEROLOGY | Facility: HOSPITAL | Age: 84
End: 2024-06-17
Payer: MEDICARE

## 2024-06-17 DIAGNOSIS — K70.31 ALCOHOLIC CIRRHOSIS OF LIVER WITH ASCITES (MULTI): ICD-10-CM

## 2024-06-17 DIAGNOSIS — K82.4 GALLBLADDER POLYP: ICD-10-CM

## 2024-06-17 NOTE — TELEPHONE ENCOUNTER
Hepatology Nurse Coordinator Note - ADDENDUM  6/17/2024 9:52 AM  Called patient to review their most recent results and recommendations from Dr. Huston. Patient is aware her ultrasound shows a findings in the polyp, that may be a stone, but can not exclude being a polyp, which radiology now describes the finding as. She's aware a MRI Liver and MRCP is being recommended to further evaluate. If she would like to not proceed with MRI, then Dr. Huston would continue monitoring with a liver and gallbladder ultrasound. Patient is agreeable to Liver MRI/MRCP. She's aware I will clarify with Dr. Huston if she needs to get now vs. future date and would call back with an update. She verbalized understanding.     ----- Message from Peter Huston MD sent at 6/6/2024  5:12 PM EDT -----  Finding in the gallbladder may be simply a stone, but cannot exclude a polyp (which is how the radiology report describes the finding). The recommendation was to consider a MRI Liver and MRCP for further evaluation. If the patient would like to evaluate further, let's offer MRI. Otherwise will continue with a liver and GB ultrasound.

## 2024-06-18 DIAGNOSIS — I12.9 TYPE 2 DIABETES MELLITUS WITH STAGE 2 CHRONIC KIDNEY DISEASE AND HYPERTENSION (MULTI): ICD-10-CM

## 2024-06-18 DIAGNOSIS — E11.22 TYPE 2 DIABETES MELLITUS WITH STAGE 2 CHRONIC KIDNEY DISEASE AND HYPERTENSION (MULTI): ICD-10-CM

## 2024-06-18 DIAGNOSIS — N18.2 TYPE 2 DIABETES MELLITUS WITH STAGE 2 CHRONIC KIDNEY DISEASE AND HYPERTENSION (MULTI): ICD-10-CM

## 2024-06-18 RX ORDER — INSULIN GLARGINE 100 [IU]/ML
4 INJECTION, SOLUTION SUBCUTANEOUS EVERY MORNING
Qty: 3 EACH | Refills: 1 | Status: SHIPPED | OUTPATIENT
Start: 2024-06-18

## 2024-06-18 RX ORDER — AMLODIPINE BESYLATE 10 MG/1
10 TABLET ORAL DAILY
Qty: 90 TABLET | Refills: 3 | Status: SHIPPED | OUTPATIENT
Start: 2024-06-18

## 2024-06-18 RX ORDER — PANTOPRAZOLE SODIUM 40 MG/1
40 TABLET, DELAYED RELEASE ORAL
Qty: 90 TABLET | Refills: 0 | Status: SHIPPED | OUTPATIENT
Start: 2024-06-18

## 2024-06-18 RX ORDER — LOSARTAN POTASSIUM 25 MG/1
25 TABLET ORAL DAILY
Qty: 90 TABLET | Refills: 3 | Status: SHIPPED | OUTPATIENT
Start: 2024-06-18

## 2024-06-20 ENCOUNTER — APPOINTMENT (OUTPATIENT)
Dept: OPHTHALMOLOGY | Facility: CLINIC | Age: 84
End: 2024-06-20
Payer: MEDICARE

## 2024-06-20 DIAGNOSIS — H40.1131 CHRONIC OPEN ANGLE GLAUCOMA OF BOTH EYES, MILD STAGE: Primary | ICD-10-CM

## 2024-06-20 PROCEDURE — 92083 EXTENDED VISUAL FIELD XM: CPT | Performed by: OPHTHALMOLOGY

## 2024-06-20 PROCEDURE — 99214 OFFICE O/P EST MOD 30 MIN: CPT | Performed by: OPHTHALMOLOGY

## 2024-06-20 RX ORDER — LATANOPROST 50 UG/ML
1 SOLUTION/ DROPS OPHTHALMIC NIGHTLY
Qty: 7.5 ML | Refills: 3 | Status: SHIPPED | OUTPATIENT
Start: 2024-06-20 | End: 2025-06-20

## 2024-06-20 ASSESSMENT — CONF VISUAL FIELD
OD_SUPERIOR_TEMPORAL_RESTRICTION: 0
OS_SUPERIOR_TEMPORAL_RESTRICTION: 0
OS_INFERIOR_NASAL_RESTRICTION: 0
OD_INFERIOR_NASAL_RESTRICTION: 0
OD_SUPERIOR_NASAL_RESTRICTION: 0
OS_SUPERIOR_NASAL_RESTRICTION: 0
OS_NORMAL: 1
OD_NORMAL: 1
OD_INFERIOR_TEMPORAL_RESTRICTION: 0
OS_INFERIOR_TEMPORAL_RESTRICTION: 0

## 2024-06-20 ASSESSMENT — TONOMETRY
OD_IOP_MMHG: 11
OS_IOP_MMHG: 14
IOP_METHOD: GOLDMANN APPLANATION

## 2024-06-20 ASSESSMENT — ENCOUNTER SYMPTOMS
GASTROINTESTINAL NEGATIVE: 0
RESPIRATORY NEGATIVE: 0
MUSCULOSKELETAL NEGATIVE: 0
EYES NEGATIVE: 1
NEUROLOGICAL NEGATIVE: 0
HEMATOLOGIC/LYMPHATIC NEGATIVE: 0
ENDOCRINE NEGATIVE: 0
PSYCHIATRIC NEGATIVE: 0
ALLERGIC/IMMUNOLOGIC NEGATIVE: 0
CONSTITUTIONAL NEGATIVE: 0
CARDIOVASCULAR NEGATIVE: 0

## 2024-06-20 ASSESSMENT — CUP TO DISC RATIO
OS_RATIO: 0.75
OD_RATIO: 0.75

## 2024-06-20 ASSESSMENT — VISUAL ACUITY
OS_SC+: +2
METHOD: SNELLEN - LINEAR
OS_SC: 20/40
OD_SC+: -1
OD_SC: 20/40

## 2024-06-20 ASSESSMENT — EXTERNAL EXAM - RIGHT EYE: OD_EXAM: DERMATOCHALASIS

## 2024-06-20 ASSESSMENT — EXTERNAL EXAM - LEFT EYE: OS_EXAM: DERMATOCHALASIS

## 2024-06-20 NOTE — PROGRESS NOTES
primary open angle glaucoma,  moderate OS, mild OD   s/p express shunt OS with Dr. Valiente   tmax in our chart 24/25   IOP has been stable for several years on current regimen  Strong Visual Field - OU - Both Eyes          Nsd os  No evidence of glaucoma OU          OCT, Optic Nerve - OU - Both Eyes          Right Eye  Images reviewed and comparison made to baseline.     Left Eye  Images reviewed and comparison made to baseline.     Notes  Thinning OS>OD, both stable to 2019           IOP great OU and stable   ok to monitor   cpm of cosopt BID latan qhs OU   rtc 6 months for dfe/oct rnfl      dry eye: OS>OD   has intermittent visual disturbance    continue atifiicial tears QID and PF AT PRN      Pseudophakia OU: mild PCO  D/w patient yag cap, she wishes to defer for now   Monitor

## 2024-06-21 RX ORDER — PEN NEEDLE, DIABETIC 32GX 5/32"
NEEDLE, DISPOSABLE MISCELLANEOUS
Qty: 100 EACH | Refills: 3 | Status: SHIPPED | OUTPATIENT
Start: 2024-06-21

## 2024-06-21 NOTE — TELEPHONE ENCOUNTER
"Patient asks for   BD Indu 2nd Gen Pen Needle 32 gauge x 5/32\" "[employer]   [id number]  Phone: [phone]   Fax: [phone]        " Peng Advancement Flap Text: The defect edges were debeveled with a #15 scalpel blade.  Given the location of the defect, shape of the defect and the proximity to free margins a Peng advancement flap was deemed most appropriate.  Using a sterile surgical marker, an appropriate advancement flap was drawn incorporating the defect and placing the expected incisions within the relaxed skin tension lines where possible. The area thus outlined was incised deep to adipose tissue with a #15 scalpel blade.  The skin margins were undermined to an appropriate distance in all directions utilizing iris scissors.

## 2024-06-28 ENCOUNTER — SPECIALTY PHARMACY (OUTPATIENT)
Dept: PHARMACY | Facility: CLINIC | Age: 84
End: 2024-06-28

## 2024-06-28 PROCEDURE — RXMED WILLOW AMBULATORY MEDICATION CHARGE

## 2024-06-29 ENCOUNTER — PHARMACY VISIT (OUTPATIENT)
Dept: PHARMACY | Facility: CLINIC | Age: 84
End: 2024-06-29
Payer: COMMERCIAL

## 2024-07-01 ENCOUNTER — TELEPHONE (OUTPATIENT)
Dept: GASTROENTEROLOGY | Facility: HOSPITAL | Age: 84
End: 2024-07-01
Payer: MEDICARE

## 2024-07-02 NOTE — TELEPHONE ENCOUNTER
"Hepatology Nurse Coordinator Note   Called back with updated recommendations from Dr. Huston. Reviewed with patient. She is aware Dr. Huston would like her to get her Liver MRI/MRCP now. Provided scheduling number. Patient states she has \"metal in her eye from a surgery.\" She's aware it would be noted on order and would make Dr. Huston. She's aware to notify schedulers/radiology as well. Patient verbalized understanding.   "

## 2024-07-18 ENCOUNTER — APPOINTMENT (OUTPATIENT)
Dept: PRIMARY CARE | Facility: CLINIC | Age: 84
End: 2024-07-18
Payer: MEDICARE

## 2024-07-18 VITALS
TEMPERATURE: 97 F | RESPIRATION RATE: 15 BRPM | DIASTOLIC BLOOD PRESSURE: 60 MMHG | BODY MASS INDEX: 19.28 KG/M2 | HEIGHT: 61 IN | WEIGHT: 102.1 LBS | OXYGEN SATURATION: 98 % | SYSTOLIC BLOOD PRESSURE: 138 MMHG | HEART RATE: 78 BPM

## 2024-07-18 DIAGNOSIS — Z13.89 ENCOUNTER FOR SCREENING FOR OTHER DISORDER: ICD-10-CM

## 2024-07-18 DIAGNOSIS — Z13.9 ENCOUNTER FOR SCREENING INVOLVING SOCIAL DETERMINANTS OF HEALTH (SDOH): ICD-10-CM

## 2024-07-18 DIAGNOSIS — J30.9 ALLERGIC RHINITIS, UNSPECIFIED SEASONALITY, UNSPECIFIED TRIGGER: ICD-10-CM

## 2024-07-18 DIAGNOSIS — E11.42 DM TYPE 2 WITH DIABETIC PERIPHERAL NEUROPATHY (MULTI): ICD-10-CM

## 2024-07-18 DIAGNOSIS — K70.31 ALCOHOLIC CIRRHOSIS OF LIVER WITH ASCITES (MULTI): ICD-10-CM

## 2024-07-18 DIAGNOSIS — Z78.0 ASYMPTOMATIC MENOPAUSE: ICD-10-CM

## 2024-07-18 DIAGNOSIS — Z00.00 ENCOUNTER FOR MEDICARE ANNUAL WELLNESS EXAM: Primary | ICD-10-CM

## 2024-07-18 PROCEDURE — 1158F ADVNC CARE PLAN TLK DOCD: CPT | Performed by: FAMILY MEDICINE

## 2024-07-18 PROCEDURE — 99397 PER PM REEVAL EST PAT 65+ YR: CPT | Performed by: FAMILY MEDICINE

## 2024-07-18 PROCEDURE — 3075F SYST BP GE 130 - 139MM HG: CPT | Performed by: FAMILY MEDICINE

## 2024-07-18 PROCEDURE — 3078F DIAST BP <80 MM HG: CPT | Performed by: FAMILY MEDICINE

## 2024-07-18 PROCEDURE — 1159F MED LIST DOCD IN RCRD: CPT | Performed by: FAMILY MEDICINE

## 2024-07-18 PROCEDURE — G0446 INTENS BEHAVE THER CARDIO DX: HCPCS | Performed by: FAMILY MEDICINE

## 2024-07-18 PROCEDURE — G0439 PPPS, SUBSEQ VISIT: HCPCS | Performed by: FAMILY MEDICINE

## 2024-07-18 PROCEDURE — 1036F TOBACCO NON-USER: CPT | Performed by: FAMILY MEDICINE

## 2024-07-18 PROCEDURE — 1160F RVW MEDS BY RX/DR IN RCRD: CPT | Performed by: FAMILY MEDICINE

## 2024-07-18 PROCEDURE — 1170F FXNL STATUS ASSESSED: CPT | Performed by: FAMILY MEDICINE

## 2024-07-18 PROCEDURE — 99214 OFFICE O/P EST MOD 30 MIN: CPT | Performed by: FAMILY MEDICINE

## 2024-07-18 PROCEDURE — 1123F ACP DISCUSS/DSCN MKR DOCD: CPT | Performed by: FAMILY MEDICINE

## 2024-07-18 RX ORDER — CETIRIZINE HYDROCHLORIDE 10 MG/1
10 TABLET ORAL DAILY
Qty: 90 TABLET | Refills: 3 | Status: SHIPPED | OUTPATIENT
Start: 2024-07-18 | End: 2025-07-18

## 2024-07-18 ASSESSMENT — ACTIVITIES OF DAILY LIVING (ADL)
TAKING_MEDICATION: INDEPENDENT
BATHING: INDEPENDENT
DOING_HOUSEWORK: INDEPENDENT
MANAGING_FINANCES: INDEPENDENT
GROCERY_SHOPPING: INDEPENDENT
DRESSING: INDEPENDENT

## 2024-07-18 ASSESSMENT — PATIENT HEALTH QUESTIONNAIRE - PHQ9
SUM OF ALL RESPONSES TO PHQ9 QUESTIONS 1 AND 2: 0
1. LITTLE INTEREST OR PLEASURE IN DOING THINGS: NOT AT ALL
2. FEELING DOWN, DEPRESSED OR HOPELESS: NOT AT ALL

## 2024-07-18 NOTE — PATIENT INSTRUCTIONS
Ankle and knee swelling  - try some exercise to see if this helps  - can try compression stockings  - let me know if not better or with pain      Throat and eye symptoms- due to allergies  - Zyrtec   - let me know if not better for referral for ENT      Referral was placed to our community health workers regarding the needs you expressed in the office. ( For the dentist)  One of our community health workers  (Anahy Kim) will be contacting you within the next 5 business days  *If  she does not call you, you can call them at- [phone]        Please follow up in  1 yr for medicare wellness and 6 months   for HTN or as needed.       ** If labs or imaging ordered at today's visit, all the non-urgent results will be discussed at your next visit    If you have been referred for a special test or to a specialist please call  [phone] to schedule an appointment.  If you have any further questions, or if develop new or worsened symptoms, please give our office a call at [phone].

## 2024-07-18 NOTE — PROGRESS NOTES
"Subjective   Reason for Visit: Yulia Fonseca is an 83 y.o. female here for a Medicare Wellness visit.     Past Medical, Surgical, and Family History reviewed and updated in chart.    Reviewed all medications by prescribing practitioner or clinical pharmacist (such as prescriptions, OTCs, herbal therapies and supplements) and documented in the medical record.    [facility]    Patient Care Team:  Yanique Bedoya DO as PCP - General  Yanique Bedoya DO as PCP - [employer] PCP     Sometimes with ankle and knee with swelling- worsened with prolonged sitting    Watery left eye and itchy throat and cough started 3-4 wks ago    Review of Systems  All systems reviewed and neg if not noted in the HPI above       Objective   Vitals:  /60 (Patient Position: Sitting)   Pulse 78   Temp 36.1 °C (97 °F)   Resp 15   Ht 1.549 m (5' 1\")   Wt 46.3 kg (102 lb 1.6 oz)   SpO2 98%   BMI 19.29 kg/m²       Physical Exam    Pleasant  Eyes: conjunctiva non-icteric and eye lids are without obvious rash or drooping. Pupils are symmetric.   Ears, Nose, Mouth, and Throat: External ears and nose appear to be without deformity or rash. No lesions or masses noted. Hearing is grossly intact.   CV: RRR, no murmur  Carotids: no bruits  Pulm:CTA B/L  Abd: soft, NTTP, + BS  LE: no edema  Psychiatric: Alert, orientation to person, place, and time. Recent/remote memory as evidenced through face-to-face interaction and discussion appear grossly intact. Mood and affect are normal.        Assessment/Plan   Problem List Items Addressed This Visit       Allergic rhinitis    Relevant Medications    cetirizine (ZyrTEC) 10 mg tablet    Cirrhosis of liver (Multi)    Relevant Orders    Hepatic Function Panel    CBC and Auto Differential    Basic metabolic panel     Other Visit Diagnoses       Encounter for Medicare annual wellness exam    -  Primary    Relevant Orders    Hemoglobin A1C    TSH with reflex to Free T4 if abnormal    Hepatic Function " Panel    CBC and Auto Differential    Basic metabolic panel    Asymptomatic menopause        Relevant Orders    XR DEXA bone density    Encounter for screening for other disorder        DM type 2 with diabetic peripheral neuropathy (Multi)        Relevant Orders    Hemoglobin A1C    Encounter for screening involving social determinants of health (SDoH)        Relevant Orders    Referral to Community Health Worker                    Depression Screening  5-10 minutes  were spent screening for depression using PHQ2/PHQ9 as documented in the chart.     Cardiac Risk Assessment  15 minutes were spent assessing and discussing cardiovascular risk was and, if needed, lifestyle modifications recommended, including nutritional choices, exercise, and elimination of habits contributing to risk. Aspirin use/disuse was discussed following the guidelines below.     Low dose ASA ( mg) should be considered:  If you have prior Heart Attack/Stroke/Peripheral vascular disease:  Generally recommend daily low dose aspirin unless extremely high bleeding risk (e.g., gastrointestinal).     If you do not have prior Heart Attack/Stroke/Peripheral vascular disease:    Age < 70 and your 10-year cardiovascular disease risk is >20%, use low dose Aspirin   Age >=70: Do not use Aspirin for prevention                   Please follow up in  1 yr for medicare wellness and 6 months   for HTN or as needed.

## 2024-07-22 PROCEDURE — RXMED WILLOW AMBULATORY MEDICATION CHARGE

## 2024-07-23 ENCOUNTER — PHARMACY VISIT (OUTPATIENT)
Dept: PHARMACY | Facility: CLINIC | Age: 84
End: 2024-07-23
Payer: COMMERCIAL

## 2024-07-29 ENCOUNTER — HOSPITAL ENCOUNTER (OUTPATIENT)
Dept: RADIOLOGY | Facility: CLINIC | Age: 84
Discharge: HOME | End: 2024-07-29
Payer: MEDICARE

## 2024-07-29 ENCOUNTER — PATIENT OUTREACH (OUTPATIENT)
Dept: PRIMARY CARE | Facility: CLINIC | Age: 84
End: 2024-07-29
Payer: MEDICARE

## 2024-07-29 DIAGNOSIS — K70.31 ALCOHOLIC CIRRHOSIS OF LIVER WITH ASCITES (MULTI): ICD-10-CM

## 2024-07-29 DIAGNOSIS — K82.4 GALLBLADDER POLYP: ICD-10-CM

## 2024-07-29 PROCEDURE — 74183 MRI ABD W/O CNTR FLWD CNTR: CPT | Performed by: STUDENT IN AN ORGANIZED HEALTH CARE EDUCATION/TRAINING PROGRAM

## 2024-07-29 PROCEDURE — 74183 MRI ABD W/O CNTR FLWD CNTR: CPT

## 2024-07-29 PROCEDURE — 2550000001 HC RX 255 CONTRASTS: Performed by: INTERNAL MEDICINE

## 2024-07-29 PROCEDURE — A9575 INJ GADOTERATE MEGLUMI 0.1ML: HCPCS | Performed by: INTERNAL MEDICINE

## 2024-07-29 RX ORDER — GADOTERATE MEGLUMINE 376.9 MG/ML
9 INJECTION INTRAVENOUS
Status: COMPLETED | OUTPATIENT
Start: 2024-07-29 | End: 2024-07-29

## 2024-07-29 NOTE — PROGRESS NOTES
[facility]W phone and spoke to pt's grand daughter and gave her the information for pt to call for dental assistance. She will have pt phoned [facility] back if she need further assistance.

## 2024-07-31 NOTE — RESULT ENCOUNTER NOTE
I attempted to call patient with her MRI results and recommendations.  There was no answer. There are some concerns from the radiologist regarding a gallbladder polyp. Based on size and appearance, it appears to be a high risk polyp. Would need to discuss recommendations including gallbladder surgery. I wanted to convey this over the phone. Can you reach out to patients with this information. I can follow up with a phone call and/or we can schedule in clinic, if she would like to discuss in person.    Peter Huston MD

## 2024-08-01 ENCOUNTER — APPOINTMENT (OUTPATIENT)
Dept: PRIMARY CARE | Facility: CLINIC | Age: 84
End: 2024-08-01
Payer: MEDICARE

## 2024-08-01 VITALS
HEART RATE: 78 BPM | TEMPERATURE: 97 F | RESPIRATION RATE: 15 BRPM | HEIGHT: 59 IN | WEIGHT: 102 LBS | SYSTOLIC BLOOD PRESSURE: 110 MMHG | OXYGEN SATURATION: 98 % | BODY MASS INDEX: 20.56 KG/M2 | DIASTOLIC BLOOD PRESSURE: 50 MMHG

## 2024-08-01 DIAGNOSIS — I10 BENIGN ESSENTIAL HYPERTENSION: ICD-10-CM

## 2024-08-01 NOTE — PROGRESS NOTES
Patient is here for blood pressure check, her reading today is 110/50. Dr. Bedoya wants the patient to continue current plan and follow-up with PCP. Patient understood and is scheduled for follow-up .

## 2024-08-21 ENCOUNTER — OFFICE VISIT (OUTPATIENT)
Dept: GASTROENTEROLOGY | Facility: HOSPITAL | Age: 84
End: 2024-08-21
Payer: MEDICARE

## 2024-08-21 VITALS
DIASTOLIC BLOOD PRESSURE: 56 MMHG | HEART RATE: 65 BPM | WEIGHT: 103.2 LBS | OXYGEN SATURATION: 98 % | SYSTOLIC BLOOD PRESSURE: 132 MMHG | TEMPERATURE: 97 F | BODY MASS INDEX: 20.8 KG/M2

## 2024-08-21 DIAGNOSIS — R93.2 ABNORMAL MAGNETIC RESONANCE IMAGING OF LIVER: ICD-10-CM

## 2024-08-21 DIAGNOSIS — K82.4 GALLBLADDER POLYP: Primary | ICD-10-CM

## 2024-08-21 DIAGNOSIS — K70.31 ALCOHOLIC CIRRHOSIS OF LIVER WITH ASCITES (MULTI): ICD-10-CM

## 2024-08-21 PROCEDURE — 1125F AMNT PAIN NOTED PAIN PRSNT: CPT | Performed by: INTERNAL MEDICINE

## 2024-08-21 PROCEDURE — 99214 OFFICE O/P EST MOD 30 MIN: CPT | Performed by: INTERNAL MEDICINE

## 2024-08-21 PROCEDURE — 3075F SYST BP GE 130 - 139MM HG: CPT | Performed by: INTERNAL MEDICINE

## 2024-08-21 PROCEDURE — 1123F ACP DISCUSS/DSCN MKR DOCD: CPT | Performed by: INTERNAL MEDICINE

## 2024-08-21 PROCEDURE — 1159F MED LIST DOCD IN RCRD: CPT | Performed by: INTERNAL MEDICINE

## 2024-08-21 PROCEDURE — 3078F DIAST BP <80 MM HG: CPT | Performed by: INTERNAL MEDICINE

## 2024-08-21 RX ORDER — BISMUTH SUBSALICYLATE 262 MG
1 TABLET,CHEWABLE ORAL DAILY
COMMUNITY

## 2024-08-21 SDOH — ECONOMIC STABILITY: FOOD INSECURITY: WITHIN THE PAST 12 MONTHS, YOU WORRIED THAT YOUR FOOD WOULD RUN OUT BEFORE YOU GOT MONEY TO BUY MORE.: NEVER TRUE

## 2024-08-21 SDOH — ECONOMIC STABILITY: FOOD INSECURITY: WITHIN THE PAST 12 MONTHS, THE FOOD YOU BOUGHT JUST DIDN'T LAST AND YOU DIDN'T HAVE MONEY TO GET MORE.: NEVER TRUE

## 2024-08-21 ASSESSMENT — PATIENT HEALTH QUESTIONNAIRE - PHQ9
2. FEELING DOWN, DEPRESSED OR HOPELESS: SEVERAL DAYS
1. LITTLE INTEREST OR PLEASURE IN DOING THINGS: SEVERAL DAYS
SUM OF ALL RESPONSES TO PHQ9 QUESTIONS 1 & 2: 2
10. IF YOU CHECKED OFF ANY PROBLEMS, HOW DIFFICULT HAVE THESE PROBLEMS MADE IT FOR YOU TO DO YOUR WORK, TAKE CARE OF THINGS AT HOME, OR GET ALONG WITH OTHER PEOPLE: NOT DIFFICULT AT ALL

## 2024-08-21 ASSESSMENT — PAIN SCALES - GENERAL: PAINLEVEL: 10-WORST PAIN EVER

## 2024-08-21 ASSESSMENT — LIFESTYLE VARIABLES
HOW OFTEN DO YOU HAVE A DRINK CONTAINING ALCOHOL: NEVER
SKIP TO QUESTIONS 9-10: 1
HOW MANY STANDARD DRINKS CONTAINING ALCOHOL DO YOU HAVE ON A TYPICAL DAY: PATIENT DOES NOT DRINK
AUDIT-C TOTAL SCORE: 0
HOW OFTEN DO YOU HAVE SIX OR MORE DRINKS ON ONE OCCASION: NEVER

## 2024-08-21 NOTE — PATIENT INSTRUCTIONS
Welcome to Dr[facility].  Dr. Huston sees patients at the following sites:  [place] [address] [facility] [address] at [facility], [facility] [address]    Dr. Huston's hepatology care coordinator, Rajani SUAZO, can be reached at [phone].  Dr. Huston's , Socorro Rivera, can be reached at [phone].     We discussed the finding of nodule or a polyp on your gallbladder.   Our decision was to repeat an ultrasound in about 3 months (rather than a surgical evaluation).  See me in the office to review further after your ultrasound.

## 2024-08-21 NOTE — PROGRESS NOTES
Subjective     Cirrhosis follow up visit.  Review of recent MRI.    History of Present Illness:   Yulia Fonseca is a 83 y.o. female who presents to the [facility] at [facility] for alcohol related cirrhosis.    Doing well on lactulose - takes once or twice per day.  Continues Rifaximin.  Also takes pantoprazole.    Had a liver US for HCC screening, 5/22/24.  The radiologist identified concerning changes in gallbladder polyps:    GALLBLADDER:  The gallbladder is distended  The gallbladder wall is thickened and laminated. This was seen  previously on 01/17/2020 There are no gallstones.  There is no sludge.  There is no pericholecystic fluid.  There is a 0.5 cm nonshadowing echogenic area in the proximal body of  the gallbladder which could represent a pedunculated polyp. On  01/17/2024, this measured 0.5 cm This was not seen on 07/07/2023.  On 12/08/2022, this measured 0.5 cm.  This was not seen on 03/07/2022  There is a 1.1 cm bilobed nonshadowing echogenic foci in the midbody  of the gallbladder. This could represent a sessile polyp. On  01/17/2024, this measured 1.2 cm. On 07/07/2023, this measured 1.2 cm.  On 12/08/2022, this measured 0.9 cm.  On 03/07/2022, this measured 0.8 cm.  On 07/08/2021, this measured 0.7 cm  On 08/05/2020, this measured 0.5 cm.    Her US was reviewed at  with the recommendation to obtain a MRI.  MRI was completed on 7/29/24 and subsequently reviewed at the [facility], again:    GALLBLADDER:  12 mm slightly enhancing lesion within the gallbladder body (series 2, image 41, series 36, image 33). A smaller 3 mm polyp is noted at the fundus (series 36, image 51).    We discussed the findings and differential diagnosis of these gallbladder findings including stones/sludge, an adenoma or even a adenocarcinoma. We also discussed cholecystectomy.    Review of Systems  Review of Systems   All other systems reviewed and are negative.    Treated for an abscess  on face in Sept / Oct.  2023.  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), Dietary counseling and surveillance (04/03/2019), Encounter for screening mammogram for malignant neoplasm of breast, Epistaxis (03/19/2018), Hepatic encephalopathy (Multi) (03/24/2017), Immunization not carried out because of patient refusal, Localized edema (02/10/2016), Other conditions influencing health status, Other specified abnormal findings of blood chemistry (12/08/2016), Other specified disorders of nose and nasal sinuses (08/26/2016), Other specified disorders of nose and nasal sinuses (01/09/2018), Other symptoms and signs concerning food and fluid intake (04/03/2019), Pain in right arm (09/11/2017), Pain in right toe(s) (08/26/2016), Personal history of diseases of the skin and subcutaneous tissue (01/09/2018), Personal history of other diseases of the circulatory system (10/26/2015), Personal history of other diseases of the digestive system (04/03/2019), Personal history of other diseases of the nervous system and sense organs (10/26/2015), Personal history of other diseases of the nervous system and sense organs, Personal history of other endocrine, nutritional and metabolic disease (09/29/2017), Personal history of other endocrine, nutritional and metabolic disease (01/17/2018), Personal history of other infectious and parasitic diseases, Personal history of other mental and behavioral disorders (03/24/2019), Personal history of other mental and behavioral disorders (02/04/2016), Personal history of other specified conditions (11/09/2018), Preglaucoma, unspecified, unspecified eye (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     Social History   reports that she has quit smoking. Her smoking use included cigarettes. She has never been exposed to tobacco smoke. She has never used smokeless tobacco.  "She reports that she does not currently use alcohol. She reports that she does not use drugs.     Family History  family history includes Cancer in her sister; Coranary Arteriosclerosis in her father; Coronary artery disease in her father; Thromboenbolic Disease in her mother.     Allergies  No Known Allergies    Medications  Current Outpatient Medications   Medication Instructions    alcohol swabs (BD Alcohol Swabs) pads, medicated Use as directed daily for checking blood sugars    amLODIPine (NORVASC) 10 mg, oral, Daily    artificial tears, dextran-hypomel-glycerin, 0.1-0.3-0.2 % ophthalmic solution ophthalmic (eye), 4 times daily    BD Indu 2nd Gen Pen Needle 32 gauge x 5/32\" needle Use daily with insulin for DM    blood glucose control, low (True Metrix Level 1) solution Use as directed to check blood sugars    blood sugar diagnostic (OneTouch Verio test strips) strip Use to check BS twice daily    blood sugar diagnostic (True Metrix Glucose Test Strip) strip Use as directed daily to help check blood sugars    blood-glucose meter (True Metrix Air Glucose Meter) misc Use as directed daily to help check blood sugars    blood-glucose sensor (Dexcom G7 Sensor) device Use often to check your sugars- change every 10days    calcium carbonate (Oscal) 500 mg calcium (1,250 mg) tablet oral, Daily RT    cetirizine (ZYRTEC) 10 mg, oral, Daily    cholecalciferol (VITAMIN D-3) 50 mcg, oral, Daily    clindamycin (Cleocin T) 1 % lotion Topical, 2 times daily    Dexcom G4 platinum  (Dexcom G7 ) misc Use as instructed for blood sugars    dextran 70-hypromellose drops 1-2 drops, ophthalmic (eye), 4 times daily    dorzolamide-timoloL (Cosopt) 22.3-6.8 mg/mL ophthalmic solution 1 drop, Both Eyes, 2 times daily    FreeStyle Jason sensor system (FreeStyle Jason 2 Sensor) kit Use as instructed to check your BS often every 14 days change    lactulose 10 gram/15 mL (15 mL) solution 30 mL, oral, Daily    lancets (TRUEplus " "Lancets) 33 gauge misc Use as directed daily to help check blood sugars    lancets misc Use to check BS twice daily    Lantus Solostar U-100 Insulin 4 Units, subcutaneous, Every morning    latanoprost (Xalatan) 0.005 % ophthalmic solution 1 drop, Both Eyes, Nightly    losartan (COZAAR) 25 mg, oral, Daily    mirtazapine (Remeron) 7.5 mg tablet oral    multivitamin tablet 1 tablet, oral, Daily    mupirocin (Bactroban) 2 % ointment     pantoprazole (PROTONIX) 40 mg, oral, Daily before breakfast    rifAXIMin (Xifaxan) 550 mg tablet TAKE ONE (1) TABLET BY MOUTH TWICE DAILY.    triamcinolone (Kenalog) 0.1 % ointment 1 Application, Topical, 2 times daily, APPLY AND GENTLY MASSAGE INTO AFFECTED AREA(S) TWICE DAILY.        Objective   Visit Vitals  /56   Pulse 65   Temp 36.1 °C (97 °F)          1/3/2024     1:36 PM 1/8/2024    11:05 AM 1/24/2024    10:47 AM 7/18/2024     2:13 PM 7/29/2024    10:32 AM 8/1/2024    11:04 AM 8/21/2024     1:03 PM   Vitals   Systolic 154 169 120 138  110 132   Diastolic 79 65 60 60  50 56   Heart Rate 80 64 65 78  78 65   Temp 36.5 °C (97.7 °F)  35.9 °C (96.6 °F) 36.1 °C (97 °F)  36.1 °C (97 °F) 36.1 °C (97 °F)   Resp   14 15  15    Height (in) 1.549 m (5' 1\")  1.549 m (5' 1\") 1.549 m (5' 1\") 1.5 m (4' 11.06\") 1.5 m (4' 11.06\")    Weight (lb) 101 102.9 108.1 102.1 102 102 103.2   BMI 19.08 kg/m2 19.44 kg/m2 20.43 kg/m2 19.29 kg/m2 20.56 kg/m2 20.56 kg/m2 20.8 kg/m2   BSA (m2) 1.4 m2 1.42 m2 1.45 m2 1.41 m2 1.39 m2 1.39 m2 1.4 m2   Visit Report Report Report Report Report   Report     Physical Exam  Vitals reviewed.   Constitutional:       General: She is awake.   Cardiovascular:      Rate and Rhythm: Normal rate and regular rhythm.   Pulmonary:      Effort: Pulmonary effort is normal.      Breath sounds: Normal breath sounds.   Neurological:      Mental Status: She is alert and oriented to person, place, and time.   Psychiatric:         Attention and Perception: Attention and perception " normal.         Behavior: Behavior normal.       Labs    WBC   Date/Time Value Ref Range Status   01/08/2024 01:01 PM 4.4 4.4 - 11.3 x10*3/uL Final     Hemoglobin   Date/Time Value Ref Range Status   01/08/2024 01:01 PM 12.3 12.0 - 16.0 g/dL Final     Hematocrit   Date/Time Value Ref Range Status   01/08/2024 01:01 PM 39.4 36.0 - 46.0 % Final     MCV   Date/Time Value Ref Range Status   01/08/2024 01:01 PM 88 80 - 100 fL Final     Platelets   Date/Time Value Ref Range Status   01/08/2024 01:01  (L) 150 - 450 x10*3/uL Final        Total Protein   Date/Time Value Ref Range Status   01/08/2024 01:01 PM 8.2 6.4 - 8.2 g/dL Final     Albumin   Date/Time Value Ref Range Status   01/08/2024 01:01 PM 4.7 3.4 - 5.0 g/dL Final     AST   Date/Time Value Ref Range Status   01/08/2024 01:01 PM 41 (H) 9 - 39 U/L Final     ALT   Date/Time Value Ref Range Status   01/08/2024 01:01 PM 31 7 - 45 U/L Final     Comment:     Patients treated with Sulfasalazine may generate falsely decreased results for ALT.     Alkaline Phosphatase   Date/Time Value Ref Range Status   01/08/2024 01:01  33 - 136 U/L Final     Bilirubin, Total   Date/Time Value Ref Range Status   01/08/2024 01:01 PM 0.8 0.0 - 1.2 mg/dL Final     Bilirubin, Direct   Date/Time Value Ref Range Status   01/08/2024 01:01 PM 0.2 0.0 - 0.3 mg/dL Final        Vitamin D, 25-Hydroxy   Date/Time Value Ref Range Status   07/10/2020 10:33 AM 63 ng/mL Final     Comment:     .  DEFICIENCY:         < 20   NG/ML  INSUFFICIENCY:      20-29  NG/ML  SUFFICIENCY:         NG/ML    THIS ASSAY ACCURATELY QUANTIFIES THE SUM OF  VITAMIN D3, 25-HYDROXY AND VIT D2,25-HYDROXY.          AST   Date/Time Value Ref Range Status   01/08/2024 01:01 PM 41 (H) 9 - 39 U/L Final     ALT   Date/Time Value Ref Range Status   01/08/2024 01:01 PM 31 7 - 45 U/L Final     Comment:     Patients treated with Sulfasalazine may generate falsely decreased results for ALT.     Alkaline Phosphatase    Date/Time Value Ref Range Status   01/08/2024 01:01  33 - 136 U/L Final     Bilirubin, Total   Date/Time Value Ref Range Status   01/08/2024 01:01 PM 0.8 0.0 - 1.2 mg/dL Final     Bilirubin, Direct   Date/Time Value Ref Range Status   01/08/2024 01:01 PM 0.2 0.0 - 0.3 mg/dL Final     Albumin   Date/Time Value Ref Range Status   01/08/2024 01:01 PM 4.7 3.4 - 5.0 g/dL Final     Total Protein   Date/Time Value Ref Range Status   01/08/2024 01:01 PM 8.2 6.4 - 8.2 g/dL Final        Protime   Date/Time Value Ref Range Status   01/08/2024 01:01 PM 13.1 (H) 9.8 - 12.8 seconds Final     INR   Date/Time Value Ref Range Status   01/08/2024 01:01 PM 1.2 (H) 0.9 - 1.1 Final       Assessment/Plan   Yulia Fonseca is a 83 y.o. female who presents to GI/Liver clinic for cirrhosis follow up.      Assessment:  82 y/o F with prior alcoholic hepatitis, and now stable alcohol related cirrhosis.  LIver disease is stable. She remains sober from all alcohol use. She is sober and has stopped all alcohol consumption for some time.      I had questioned whether she was actually cirrhotic (in the past). Her Fibroscan results (46 kPa) supports cirrhosis and portal hypertension.  Prior EGD several years ago with small varices indicative of portal HTN as well.     She has had some issues with encephalopathy, taking lactulose and rifaximin. She is currently doing well on both medications with no further episodes of HE. We will continue Generlac - which she prefers.     Tubular adenoma found on colonoscopy in 2016.     Discussed the risks and benefits of repeating an EGD (> 3 years since last EGD) and after consideration the patient has declined a follow-up endoscopy.     Gallbladder polyps and the pancreatic duct stones noted on recent liver US and MRI. We had a long discussion about what appears to be a high risk polyp/lesion in her GB ~ 1.2 cm in size (which appears to have some growth over the past few years).      Recs:  - The patient  doesn't want to pursue surgical options given her age and overall health. She understands the possibility of further growth and even the possibility of this polyp harboring a cancer and/or becoming a cancer in the future. She favors observation at this time - will monitor with a liver/GB US in 3 months.    Otherwise, regarding her cirrhosis management:  - Continue lactulose. Rifaximin.  - We discussed variceal surveillance in the past - and decided not to continue endoscopic surveillance. We have also decided against further surveillance for colon polyps, both appropriate decisions due to her age.   - If patient needs additional medications for HTN would recommend nonselective betablocker at that time     She will return in 3 months to review the results of her follow up US.      Instructions      Peter Huston MD

## 2024-08-21 NOTE — LETTER
August 27, 2024     Yanique Bedoya DO  [address]  [place] [address]  [facility] [address]    Patient: Yulia Fonseca   YOB: 1940   Date of Visit: 8/21/2024       Dear Dr. Yanique Bedoya DO:    Thank you for referring Yulia Fonseca to me for evaluation. Below are my notes for this consultation.  If you have questions, please do not hesitate to call me. I look forward to following your patient along with you.       Sincerely,     Peter Huston MD      CC: No Recipients  ______________________________________________________________________________________    Subjective    Cirrhosis follow up visit.  Review of recent MRI.    History of Present Illness:   Yulia Fonseca is a 83 y.o. female who presents to the [facility] at [facility] for alcohol related cirrhosis.    Doing well on lactulose - takes once or twice per day.  Continues Rifaximin.  Also takes pantoprazole.    Had a liver US for HCC screening, 5/22/24.  The radiologist identified concerning changes in gallbladder polyps:    GALLBLADDER:  The gallbladder is distended  The gallbladder wall is thickened and laminated. This was seen  previously on 01/17/2020 There are no gallstones.  There is no sludge.  There is no pericholecystic fluid.  There is a 0.5 cm nonshadowing echogenic area in the proximal body of  the gallbladder which could represent a pedunculated polyp. On  01/17/2024, this measured 0.5 cm This was not seen on 07/07/2023.  On 12/08/2022, this measured 0.5 cm.  This was not seen on 03/07/2022  There is a 1.1 cm bilobed nonshadowing echogenic foci in the midbody  of the gallbladder. This could represent a sessile polyp. On  01/17/2024, this measured 1.2 cm. On 07/07/2023, this measured 1.2 cm.  On 12/08/2022, this measured 0.9 cm.  On 03/07/2022, this measured 0.8 cm.  On 07/08/2021, this measured 0.7 cm  On 08/05/2020, this measured 0.5 cm.    Her US was reviewed at  with the recommendation to obtain a MRI.  MRI  was completed on 7/29/24 and subsequently reviewed at the Hepatobiliary Tumor Board, again:    GALLBLADDER:  12 mm slightly enhancing lesion within the gallbladder body (series 2, image 41, series 36, image 33). A smaller 3 mm polyp is noted at the fundus (series 36, image 51).    We discussed the findings and differential diagnosis of these gallbladder findings including stones/sludge, an adenoma or even a adenocarcinoma. We also discussed cholecystectomy.    Review of Systems  Review of Systems   All other systems reviewed and are negative.    Treated for an abscess on face in Sept / Oct.  2023.  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), Dietary counseling and surveillance (04/03/2019), Encounter for screening mammogram for malignant neoplasm of breast, Epistaxis (03/19/2018), Hepatic encephalopathy (Multi) (03/24/2017), Immunization not carried out because of patient refusal, Localized edema (02/10/2016), Other conditions influencing health status, Other specified abnormal findings of blood chemistry (12/08/2016), Other specified disorders of nose and nasal sinuses (08/26/2016), Other specified disorders of nose and nasal sinuses (01/09/2018), Other symptoms and signs concerning food and fluid intake (04/03/2019), Pain in right arm (09/11/2017), Pain in right toe(s) (08/26/2016), Personal history of diseases of the skin and subcutaneous tissue (01/09/2018), Personal history of other diseases of the circulatory system (10/26/2015), Personal history of other diseases of the digestive system (04/03/2019), Personal history of other diseases of the nervous system and sense organs (10/26/2015), Personal history of other diseases of the nervous system and sense organs, Personal history of other endocrine, nutritional and metabolic disease (09/29/2017), Personal history of other endocrine, nutritional and metabolic disease  "(01/17/2018), Personal history of other infectious and parasitic diseases, Personal history of other mental and behavioral disorders (03/24/2019), Personal history of other mental and behavioral disorders (02/04/2016), Personal history of other specified conditions (11/09/2018), Preglaucoma, unspecified, unspecified eye (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     Social History   reports that she has quit smoking. Her smoking use included cigarettes. She has never been exposed to tobacco smoke. She has never used smokeless tobacco. She reports that she does not currently use alcohol. She reports that she does not use drugs.     Family History  family history includes Cancer in her sister; Coranary Arteriosclerosis in her father; Coronary artery disease in her father; Thromboenbolic Disease in her mother.     Allergies  No Known Allergies    Medications  Current Outpatient Medications   Medication Instructions   • alcohol swabs (BD Alcohol Swabs) pads, medicated Use as directed daily for checking blood sugars   • amLODIPine (NORVASC) 10 mg, oral, Daily   • artificial tears, dextran-hypomel-glycerin, 0.1-0.3-0.2 % ophthalmic solution ophthalmic (eye), 4 times daily   • BD Indu 2nd Gen Pen Needle 32 gauge x 5/32\" needle Use daily with insulin for DM   • blood glucose control, low (True Metrix Level 1) solution Use as directed to check blood sugars   • blood sugar diagnostic (OneTouch Verio test strips) strip Use to check BS twice daily   • blood sugar diagnostic (True Metrix Glucose Test Strip) strip Use as directed daily to help check blood sugars   • blood-glucose meter (True Metrix Air Glucose Meter) misc Use as directed daily to help check blood sugars   • blood-glucose sensor (Dexcom G7 Sensor) device Use often to check your sugars- change every 10days   • calcium carbonate (Oscal) 500 mg calcium (1,250 mg) tablet oral, Daily RT   • cetirizine (ZYRTEC) 10 mg, oral, " "Daily   • cholecalciferol (VITAMIN D-3) 50 mcg, oral, Daily   • clindamycin (Cleocin T) 1 % lotion Topical, 2 times daily   • Dexcom G4 platinum  (Dexcom G7 ) misc Use as instructed for blood sugars   • dextran 70-hypromellose drops 1-2 drops, ophthalmic (eye), 4 times daily   • dorzolamide-timoloL (Cosopt) 22.3-6.8 mg/mL ophthalmic solution 1 drop, Both Eyes, 2 times daily   • FreeStyle Jason sensor system (FreeStyle Jason 2 Sensor) kit Use as instructed to check your BS often every 14 days change   • lactulose 10 gram/15 mL (15 mL) solution 30 mL, oral, Daily   • lancets (TRUEplus Lancets) 33 gauge misc Use as directed daily to help check blood sugars   • lancets misc Use to check BS twice daily   • Lantus Solostar U-100 Insulin 4 Units, subcutaneous, Every morning   • latanoprost (Xalatan) 0.005 % ophthalmic solution 1 drop, Both Eyes, Nightly   • losartan (COZAAR) 25 mg, oral, Daily   • mirtazapine (Remeron) 7.5 mg tablet oral   • multivitamin tablet 1 tablet, oral, Daily   • mupirocin (Bactroban) 2 % ointment    • pantoprazole (PROTONIX) 40 mg, oral, Daily before breakfast   • rifAXIMin (Xifaxan) 550 mg tablet TAKE ONE (1) TABLET BY MOUTH TWICE DAILY.   • triamcinolone (Kenalog) 0.1 % ointment 1 Application, Topical, 2 times daily, APPLY AND GENTLY MASSAGE INTO AFFECTED AREA(S) TWICE DAILY.        Objective  Visit Vitals  /56   Pulse 65   Temp 36.1 °C (97 °F)          1/3/2024     1:36 PM 1/8/2024    11:05 AM 1/24/2024    10:47 AM 7/18/2024     2:13 PM 7/29/2024    10:32 AM 8/1/2024    11:04 AM 8/21/2024     1:03 PM   Vitals   Systolic 154 169 120 138  110 132   Diastolic 79 65 60 60  50 56   Heart Rate 80 64 65 78  78 65   Temp 36.5 °C (97.7 °F)  35.9 °C (96.6 °F) 36.1 °C (97 °F)  36.1 °C (97 °F) 36.1 °C (97 °F)   Resp   14 15  15    Height (in) 1.549 m (5' 1\")  1.549 m (5' 1\") 1.549 m (5' 1\") 1.5 m (4' 11.06\") 1.5 m (4' 11.06\")    Weight (lb) 101 102.9 108.1 102.1 102 102 103.2   BMI " 19.08 kg/m2 19.44 kg/m2 20.43 kg/m2 19.29 kg/m2 20.56 kg/m2 20.56 kg/m2 20.8 kg/m2   BSA (m2) 1.4 m2 1.42 m2 1.45 m2 1.41 m2 1.39 m2 1.39 m2 1.4 m2   Visit Report Report Report Report Report   Report     Physical Exam  Vitals reviewed.   Constitutional:       General: She is awake.   Cardiovascular:      Rate and Rhythm: Normal rate and regular rhythm.   Pulmonary:      Effort: Pulmonary effort is normal.      Breath sounds: Normal breath sounds.   Neurological:      Mental Status: She is alert and oriented to person, place, and time.   Psychiatric:         Attention and Perception: Attention and perception normal.         Behavior: Behavior normal.       Labs    WBC   Date/Time Value Ref Range Status   01/08/2024 01:01 PM 4.4 4.4 - 11.3 x10*3/uL Final     Hemoglobin   Date/Time Value Ref Range Status   01/08/2024 01:01 PM 12.3 12.0 - 16.0 g/dL Final     Hematocrit   Date/Time Value Ref Range Status   01/08/2024 01:01 PM 39.4 36.0 - 46.0 % Final     MCV   Date/Time Value Ref Range Status   01/08/2024 01:01 PM 88 80 - 100 fL Final     Platelets   Date/Time Value Ref Range Status   01/08/2024 01:01  (L) 150 - 450 x10*3/uL Final        Total Protein   Date/Time Value Ref Range Status   01/08/2024 01:01 PM 8.2 6.4 - 8.2 g/dL Final     Albumin   Date/Time Value Ref Range Status   01/08/2024 01:01 PM 4.7 3.4 - 5.0 g/dL Final     AST   Date/Time Value Ref Range Status   01/08/2024 01:01 PM 41 (H) 9 - 39 U/L Final     ALT   Date/Time Value Ref Range Status   01/08/2024 01:01 PM 31 7 - 45 U/L Final     Comment:     Patients treated with Sulfasalazine may generate falsely decreased results for ALT.     Alkaline Phosphatase   Date/Time Value Ref Range Status   01/08/2024 01:01  33 - 136 U/L Final     Bilirubin, Total   Date/Time Value Ref Range Status   01/08/2024 01:01 PM 0.8 0.0 - 1.2 mg/dL Final     Bilirubin, Direct   Date/Time Value Ref Range Status   01/08/2024 01:01 PM 0.2 0.0 - 0.3 mg/dL Final         Vitamin D, 25-Hydroxy   Date/Time Value Ref Range Status   07/10/2020 10:33 AM 63 ng/mL Final     Comment:     .  DEFICIENCY:         < 20   NG/ML  INSUFFICIENCY:      20-29  NG/ML  SUFFICIENCY:         NG/ML    THIS ASSAY ACCURATELY QUANTIFIES THE SUM OF  VITAMIN D3, 25-HYDROXY AND VIT D2,25-HYDROXY.          AST   Date/Time Value Ref Range Status   01/08/2024 01:01 PM 41 (H) 9 - 39 U/L Final     ALT   Date/Time Value Ref Range Status   01/08/2024 01:01 PM 31 7 - 45 U/L Final     Comment:     Patients treated with Sulfasalazine may generate falsely decreased results for ALT.     Alkaline Phosphatase   Date/Time Value Ref Range Status   01/08/2024 01:01  33 - 136 U/L Final     Bilirubin, Total   Date/Time Value Ref Range Status   01/08/2024 01:01 PM 0.8 0.0 - 1.2 mg/dL Final     Bilirubin, Direct   Date/Time Value Ref Range Status   01/08/2024 01:01 PM 0.2 0.0 - 0.3 mg/dL Final     Albumin   Date/Time Value Ref Range Status   01/08/2024 01:01 PM 4.7 3.4 - 5.0 g/dL Final     Total Protein   Date/Time Value Ref Range Status   01/08/2024 01:01 PM 8.2 6.4 - 8.2 g/dL Final        Protime   Date/Time Value Ref Range Status   01/08/2024 01:01 PM 13.1 (H) 9.8 - 12.8 seconds Final     INR   Date/Time Value Ref Range Status   01/08/2024 01:01 PM 1.2 (H) 0.9 - 1.1 Final       Assessment/Plan  Yulia Fonseca is a 83 y.o. female who presents to GI/Liver clinic for cirrhosis follow up.      Assessment:  84 y/o F with prior alcoholic hepatitis, and now stable alcohol related cirrhosis.  LIver disease is stable. She remains sober from all alcohol use. She is sober and has stopped all alcohol consumption for some time.      I had questioned whether she was actually cirrhotic (in the past). Her Fibroscan results (46 kPa) supports cirrhosis and portal hypertension.  Prior EGD several years ago with small varices indicative of portal HTN as well.     She has had some issues with encephalopathy, taking lactulose and  rifaximin. She is currently doing well on both medications with no further episodes of HE. We will continue Generlac - which she prefers.     Tubular adenoma found on colonoscopy in 2016.     Discussed the risks and benefits of repeating an EGD (> 3 years since last EGD) and after consideration the patient has declined a follow-up endoscopy.     Gallbladder polyps and the pancreatic duct stones noted on recent liver US and MRI. We had a long discussion about what appears to be a high risk polyp/lesion in her GB ~ 1.2 cm in size (which appears to have some growth over the past few years).      Recs:  - The patient doesn't want to pursue surgical options given her age and overall health. She understands the possibility of further growth and even the possibility of this polyp harboring a cancer and/or becoming a cancer in the future. She favors observation at this time - will monitor with a liver/GB US in 3 months.    Otherwise, regarding her cirrhosis management:  - Continue lactulose. Rifaximin.  - We discussed variceal surveillance in the past - and decided not to continue endoscopic surveillance. We have also decided against further surveillance for colon polyps, both appropriate decisions due to her age.   - If patient needs additional medications for HTN would recommend nonselective betablocker at that time     She will return in 3 months to review the results of her follow up US.      Instructions      Peter Huston MD

## 2024-08-29 ENCOUNTER — SPECIALTY PHARMACY (OUTPATIENT)
Dept: PHARMACY | Facility: CLINIC | Age: 84
End: 2024-08-29

## 2024-08-29 ENCOUNTER — PHARMACY VISIT (OUTPATIENT)
Dept: PHARMACY | Facility: CLINIC | Age: 84
End: 2024-08-29
Payer: COMMERCIAL

## 2024-08-29 PROCEDURE — RXMED WILLOW AMBULATORY MEDICATION CHARGE

## 2024-08-30 DIAGNOSIS — K21.9 CHRONIC GERD: ICD-10-CM

## 2024-09-16 RX ORDER — PANTOPRAZOLE SODIUM 40 MG/1
TABLET, DELAYED RELEASE ORAL
Qty: 90 TABLET | Refills: 3 | Status: SHIPPED | OUTPATIENT
Start: 2024-09-16

## 2024-09-23 PROCEDURE — RXMED WILLOW AMBULATORY MEDICATION CHARGE

## 2024-10-01 ENCOUNTER — SPECIALTY PHARMACY (OUTPATIENT)
Dept: PHARMACY | Facility: CLINIC | Age: 84
End: 2024-10-01

## 2024-10-02 ENCOUNTER — PHARMACY VISIT (OUTPATIENT)
Dept: PHARMACY | Facility: CLINIC | Age: 84
End: 2024-10-02
Payer: COMMERCIAL

## 2024-10-25 PROCEDURE — RXMED WILLOW AMBULATORY MEDICATION CHARGE

## 2024-10-28 ENCOUNTER — PHARMACY VISIT (OUTPATIENT)
Dept: PHARMACY | Facility: CLINIC | Age: 84
End: 2024-10-28
Payer: COMMERCIAL

## 2024-11-21 ENCOUNTER — HOSPITAL ENCOUNTER (OUTPATIENT)
Dept: RADIOLOGY | Facility: CLINIC | Age: 84
Discharge: HOME | End: 2024-11-21
Payer: MEDICARE

## 2024-11-21 DIAGNOSIS — K70.31 ALCOHOLIC CIRRHOSIS OF LIVER WITH ASCITES (MULTI): ICD-10-CM

## 2024-11-21 DIAGNOSIS — K82.4 GALLBLADDER POLYP: ICD-10-CM

## 2024-11-21 PROCEDURE — 76705 ECHO EXAM OF ABDOMEN: CPT

## 2024-11-21 PROCEDURE — 76705 ECHO EXAM OF ABDOMEN: CPT | Performed by: STUDENT IN AN ORGANIZED HEALTH CARE EDUCATION/TRAINING PROGRAM

## 2024-11-25 PROCEDURE — RXMED WILLOW AMBULATORY MEDICATION CHARGE

## 2024-11-26 ENCOUNTER — PHARMACY VISIT (OUTPATIENT)
Dept: PHARMACY | Facility: CLINIC | Age: 84
End: 2024-11-26
Payer: COMMERCIAL

## 2024-11-29 ENCOUNTER — TELEPHONE (OUTPATIENT)
Dept: GASTROENTEROLOGY | Facility: HOSPITAL | Age: 84
End: 2024-11-29
Payer: MEDICARE

## 2024-11-29 DIAGNOSIS — K70.31 ALCOHOLIC CIRRHOSIS OF LIVER WITH ASCITES (MULTI): ICD-10-CM

## 2024-11-29 DIAGNOSIS — K82.4 GALLBLADDER POLYP: ICD-10-CM

## 2024-12-02 NOTE — TELEPHONE ENCOUNTER
Hepatology Nurse Coordinator Note  Dr. Huston reviewed patient's Liver Ultrasound. Per MD, there is still a large gallbladder polyp on Liver Ultrasound. Plan will be to continue to monitor with a repeat Liver Ultrasound in 6 months.     Called patient to review results and to return call. No answer. No voicemail picked up. Will reattempt later.

## 2024-12-03 ENCOUNTER — TELEPHONE (OUTPATIENT)
Dept: GASTROENTEROLOGY | Facility: HOSPITAL | Age: 84
End: 2024-12-03
Payer: MEDICARE

## 2024-12-12 NOTE — TELEPHONE ENCOUNTER
Hepatology Nurse Coordinator Note  Called patient to review their most recent results and recommendations from Dr. Huston.  Patient is aware there is still a large gallbladder polyp on her Liver Ultrasound.     Reviewed the following recommendations with patient from Dr. Huston.   -Get Liver Ultrasound in May 2025 to continue to monitor liver and gallbladder polyp.   -Follow up as scheduled in February 2025 with Dr. Huston. Reviewed date and time of visit with patient.   -Follow up with insurance company regarding questions on her billing/coverage.    Patient verbalized understanding of results and recommendations.

## 2024-12-24 ENCOUNTER — SPECIALTY PHARMACY (OUTPATIENT)
Dept: PHARMACY | Facility: CLINIC | Age: 84
End: 2024-12-24

## 2024-12-24 PROCEDURE — RXMED WILLOW AMBULATORY MEDICATION CHARGE

## 2024-12-27 ENCOUNTER — PHARMACY VISIT (OUTPATIENT)
Dept: PHARMACY | Facility: CLINIC | Age: 84
End: 2024-12-27
Payer: COMMERCIAL

## 2024-12-30 ENCOUNTER — TELEPHONE (OUTPATIENT)
Dept: GASTROENTEROLOGY | Facility: HOSPITAL | Age: 84
End: 2024-12-30
Payer: MEDICARE

## 2024-12-31 DIAGNOSIS — K21.9 CHRONIC GERD: ICD-10-CM

## 2025-01-03 RX ORDER — PANTOPRAZOLE SODIUM 40 MG/1
40 TABLET, DELAYED RELEASE ORAL
Qty: 90 TABLET | Refills: 0 | Status: SHIPPED | OUTPATIENT
Start: 2025-01-03

## 2025-01-06 DIAGNOSIS — H40.1131 CHRONIC OPEN ANGLE GLAUCOMA OF BOTH EYES, MILD STAGE: ICD-10-CM

## 2025-01-06 RX ORDER — DORZOLAMIDE HYDROCHLORIDE AND TIMOLOL MALEATE 20; 5 MG/ML; MG/ML
1 SOLUTION/ DROPS OPHTHALMIC 2 TIMES DAILY
Qty: 30 ML | Refills: 3 | Status: SHIPPED | OUTPATIENT
Start: 2025-01-06 | End: 2026-01-06

## 2025-01-07 DIAGNOSIS — K70.31 ALCOHOLIC CIRRHOSIS OF LIVER WITH ASCITES (MULTI): ICD-10-CM

## 2025-01-07 DIAGNOSIS — K82.4 GALLBLADDER POLYP: ICD-10-CM

## 2025-01-13 DIAGNOSIS — H40.1131 CHRONIC OPEN ANGLE GLAUCOMA OF BOTH EYES, MILD STAGE: Primary | ICD-10-CM

## 2025-01-13 RX ORDER — DORZOLAMIDE HYDROCHLORIDE AND TIMOLOL MALEATE 20; 5 MG/ML; MG/ML
1 SOLUTION/ DROPS OPHTHALMIC 2 TIMES DAILY
Qty: 30 ML | Refills: 3 | Status: SHIPPED | OUTPATIENT
Start: 2025-01-13 | End: 2026-01-13

## 2025-01-20 PROCEDURE — RXMED WILLOW AMBULATORY MEDICATION CHARGE

## 2025-01-21 ENCOUNTER — PHARMACY VISIT (OUTPATIENT)
Dept: PHARMACY | Facility: CLINIC | Age: 85
End: 2025-01-21
Payer: COMMERCIAL

## 2025-01-28 ENCOUNTER — APPOINTMENT (OUTPATIENT)
Dept: PRIMARY CARE | Facility: CLINIC | Age: 85
End: 2025-01-28
Payer: MEDICARE

## 2025-01-28 ENCOUNTER — TELEPHONE (OUTPATIENT)
Dept: GASTROENTEROLOGY | Facility: CLINIC | Age: 85
End: 2025-01-28

## 2025-01-28 VITALS
OXYGEN SATURATION: 98 % | HEIGHT: 61 IN | WEIGHT: 102 LBS | DIASTOLIC BLOOD PRESSURE: 70 MMHG | SYSTOLIC BLOOD PRESSURE: 120 MMHG | BODY MASS INDEX: 19.26 KG/M2 | TEMPERATURE: 97 F | HEART RATE: 85 BPM | RESPIRATION RATE: 14 BRPM

## 2025-01-28 DIAGNOSIS — E13.22 SECONDARY DM WITH CKD STAGE 3 AND HYPERTENSION (MULTI): Primary | ICD-10-CM

## 2025-01-28 DIAGNOSIS — N18.31 CHRONIC KIDNEY DISEASE, STAGE 3A (MULTI): ICD-10-CM

## 2025-01-28 DIAGNOSIS — N18.30 SECONDARY DM WITH CKD STAGE 3 AND HYPERTENSION (MULTI): Primary | ICD-10-CM

## 2025-01-28 DIAGNOSIS — I12.9 SECONDARY DM WITH CKD STAGE 3 AND HYPERTENSION (MULTI): Primary | ICD-10-CM

## 2025-01-28 DIAGNOSIS — F10.21 ALCOHOL DEPENDENCE, IN REMISSION: ICD-10-CM

## 2025-01-28 DIAGNOSIS — K76.82 HEPATIC ENCEPHALOPATHY: ICD-10-CM

## 2025-01-28 PROCEDURE — 1160F RVW MEDS BY RX/DR IN RCRD: CPT | Performed by: FAMILY MEDICINE

## 2025-01-28 PROCEDURE — 99213 OFFICE O/P EST LOW 20 MIN: CPT | Performed by: FAMILY MEDICINE

## 2025-01-28 PROCEDURE — 1123F ACP DISCUSS/DSCN MKR DOCD: CPT | Performed by: FAMILY MEDICINE

## 2025-01-28 PROCEDURE — 3078F DIAST BP <80 MM HG: CPT | Performed by: FAMILY MEDICINE

## 2025-01-28 PROCEDURE — 3074F SYST BP LT 130 MM HG: CPT | Performed by: FAMILY MEDICINE

## 2025-01-28 PROCEDURE — G2211 COMPLEX E/M VISIT ADD ON: HCPCS | Performed by: FAMILY MEDICINE

## 2025-01-28 PROCEDURE — 1159F MED LIST DOCD IN RCRD: CPT | Performed by: FAMILY MEDICINE

## 2025-01-28 NOTE — PROGRESS NOTES
"Subjective   Patient ID: Yulia Fonseca is a 84 y.o. female who presents for Follow-up (DM).    HPI   Dm  Has some high sugars - in the 130's in the am's, 80-90's normally  Using lantus 4unit    Has been with ankle swelling  No SOB/Cough    States her face has been breaking out    Has been with poor diet and dec appetite  Wt has been stable  Would like to see nutrition    Review of Systems  All systems reviewed and neg if not noted in the HPI above       Objective   /70 (Patient Position: Sitting)   Pulse 85   Temp 36.1 °C (97 °F)   Resp 14   Ht 1.549 m (5' 1\")   Wt 46.3 kg (102 lb)   SpO2 98%   BMI 19.27 kg/m²     Physical Exam  Vitals reviewed.   Constitutional:       Appearance: Normal appearance.   HENT:      Head: Normocephalic.   Eyes:      Extraocular Movements: Extraocular movements intact.   Cardiovascular:      Rate and Rhythm: Normal rate and regular rhythm.      Heart sounds: Normal heart sounds. No murmur heard.  Pulmonary:      Effort: Pulmonary effort is normal. No respiratory distress.      Breath sounds: Normal breath sounds. No wheezing.   Abdominal:      General: Bowel sounds are normal. There is no distension.      Palpations: Abdomen is soft.   Skin:     General: Skin is warm and dry.   Neurological:      General: No focal deficit present.      Mental Status: She is alert and oriented to person, place, and time.   Psychiatric:         Mood and Affect: Mood normal.         Behavior: Behavior normal.         Thought Content: Thought content normal.         Judgment: Judgment normal.         Assessment/Plan   Problem List Items Addressed This Visit             ICD-10-CM    Chronic kidney disease, stage 3a (Multi) N18.31     Gfr 44  Stable  Continue to monitor  Labs today         Relevant Orders    Basic Metabolic Panel (Completed)    Albumin-Creatinine Ratio, Urine Random (Completed)    Urinalysis with Reflex Microscopic (Completed)    Type 2 diabetes mellitus with stage 2 chronic kidney " disease and hypertension (Multi) - Primary E11.22, I12.9, N18.2     Checking labs today  Stable  Gfr 44  HTN- BP is at goal today  DM- hba1c         Hepatic encephalopathy K76.82     Stable  Followed by gi team  Checking labs today         Relevant Orders    CBC and Auto Differential (Completed)    Hepatic Function Panel (Completed)    Alcohol dependence, in remission F10.21     Doing better  No EOTH use                   Please follow up in JULY as scheduled or as needed.

## 2025-01-28 NOTE — TELEPHONE ENCOUNTER
Hepatology Nurse Coordinator Note   Patient left a voicemail that she wasn't able to get her ultrasound of liver today. She requested a call back. Called patient back. She states she went for her ultrasound appointment and was told it wasn't today, but 2/28/25. Patient wanted to let the office know. Patient is going to call scheduling back to see if she can coordinate for before her appointment with Dr. Huston. She's aware to call back with an update after to let us know if she was able. She's aware if she can't get before the appointment, to keep her current appointment for  ultrasound and then I would discuss with Dr. Huston to see if he wants to move her office visit until after. She verbalized understanding and states she will call back with an update.

## 2025-01-28 NOTE — PATIENT INSTRUCTIONS
Ultrasound getting done today    Fasting labs- can get today as well    Facial soap  - try dove or Cetaphil gentle    Ankle swelling  - comes ands goes, none today, continue to monitor  - ok to try compression stocking    Today we discussed trying nutrition follow up to help with wt loss, you declined, but let me know if you change your mind!      Please follow up in JULY as scheduled or as needed.       ** If labs or imaging ordered at today's visit, all the non-urgent results will be discussed at your next visit    If you have been referred for a special test or to a specialist please call  [phone] to schedule an appointment.  If you have any further questions, or if develop new or worsened symptoms, please give our office a call at [phone].

## 2025-01-29 ENCOUNTER — TELEPHONE (OUTPATIENT)
Dept: GASTROENTEROLOGY | Facility: HOSPITAL | Age: 85
End: 2025-01-29
Payer: MEDICARE

## 2025-01-29 LAB
ALBUMIN SERPL-MCNC: 4.6 G/DL (ref 3.6–5.1)
ALBUMIN/CREAT UR: 31 MG/G CREAT
ALBUMIN/GLOB SERPL: 1.4 (CALC) (ref 1–2.5)
ALP SERPL-CCNC: 125 U/L (ref 37–153)
ALT SERPL-CCNC: 21 U/L (ref 6–29)
ANION GAP SERPL CALCULATED.4IONS-SCNC: 11 MMOL/L (CALC) (ref 7–17)
APPEARANCE UR: CLEAR
AST SERPL-CCNC: 33 U/L (ref 10–35)
BACTERIA #/AREA URNS HPF: ABNORMAL /HPF
BASOPHILS # BLD AUTO: 39 CELLS/UL (ref 0–200)
BASOPHILS NFR BLD AUTO: 0.8 %
BILIRUB DIRECT SERPL-MCNC: 0.1 MG/DL
BILIRUB INDIRECT SERPL-MCNC: 0.4 MG/DL (CALC) (ref 0.2–1.2)
BILIRUB SERPL-MCNC: 0.5 MG/DL (ref 0.2–1.2)
BILIRUB UR QL STRIP: NEGATIVE
BUN SERPL-MCNC: 25 MG/DL (ref 7–25)
BUN/CREAT SERPL: 20 (CALC) (ref 6–22)
CALCIUM SERPL-MCNC: 9.9 MG/DL (ref 8.6–10.4)
CHLORIDE SERPL-SCNC: 110 MMOL/L (ref 98–110)
CHOLEST SERPL-MCNC: 162 MG/DL
CHOLEST/HDLC SERPL: 2.3 (CALC)
CO2 SERPL-SCNC: 21 MMOL/L (ref 20–32)
COLOR UR: YELLOW
CREAT SERPL-MCNC: 1.23 MG/DL (ref 0.6–0.95)
CREAT UR-MCNC: 42 MG/DL (ref 20–275)
EGFRCR SERPLBLD CKD-EPI 2021: 43 ML/MIN/1.73M2
EOSINOPHIL # BLD AUTO: 172 CELLS/UL (ref 15–500)
EOSINOPHIL NFR BLD AUTO: 3.5 %
ERYTHROCYTE [DISTWIDTH] IN BLOOD BY AUTOMATED COUNT: 13.5 % (ref 11–15)
EST. AVERAGE GLUCOSE BLD GHB EST-MCNC: 134 MG/DL
EST. AVERAGE GLUCOSE BLD GHB EST-SCNC: 7.4 MMOL/L
GLOBULIN SER CALC-MCNC: 3.4 G/DL (CALC) (ref 1.9–3.7)
GLUCOSE SERPL-MCNC: 153 MG/DL (ref 65–99)
GLUCOSE UR QL STRIP: NEGATIVE
HBA1C MFR BLD: 6.3 % OF TOTAL HGB
HCT VFR BLD AUTO: 38.3 % (ref 35–45)
HDLC SERPL-MCNC: 70 MG/DL
HGB BLD-MCNC: 11.6 G/DL (ref 11.7–15.5)
HGB UR QL STRIP: NEGATIVE
HYALINE CASTS #/AREA URNS LPF: ABNORMAL /LPF
KETONES UR QL STRIP: NEGATIVE
LDLC SERPL CALC-MCNC: 78 MG/DL (CALC)
LEUKOCYTE ESTERASE UR QL STRIP: ABNORMAL
LYMPHOCYTES # BLD AUTO: 887 CELLS/UL (ref 850–3900)
LYMPHOCYTES NFR BLD AUTO: 18.1 %
MCH RBC QN AUTO: 26.9 PG (ref 27–33)
MCHC RBC AUTO-ENTMCNC: 30.3 G/DL (ref 32–36)
MCV RBC AUTO: 88.9 FL (ref 80–100)
MICROALBUMIN UR-MCNC: 1.3 MG/DL
MONOCYTES # BLD AUTO: 397 CELLS/UL (ref 200–950)
MONOCYTES NFR BLD AUTO: 8.1 %
NEUTROPHILS # BLD AUTO: 3406 CELLS/UL (ref 1500–7800)
NEUTROPHILS NFR BLD AUTO: 69.5 %
NITRITE UR QL STRIP: NEGATIVE
NONHDLC SERPL-MCNC: 92 MG/DL (CALC)
PH UR STRIP: 6 [PH] (ref 5–8)
PLATELET # BLD AUTO: 171 THOUSAND/UL (ref 140–400)
PMV BLD REES-ECKER: 12.3 FL (ref 7.5–12.5)
POTASSIUM SERPL-SCNC: 5 MMOL/L (ref 3.5–5.3)
PROT SERPL-MCNC: 8 G/DL (ref 6.1–8.1)
PROT UR QL STRIP: NEGATIVE
RBC # BLD AUTO: 4.31 MILLION/UL (ref 3.8–5.1)
RBC #/AREA URNS HPF: ABNORMAL /HPF
SERVICE CMNT-IMP: ABNORMAL
SODIUM SERPL-SCNC: 142 MMOL/L (ref 135–146)
SP GR UR STRIP: 1.01 (ref 1–1.03)
SQUAMOUS #/AREA URNS HPF: ABNORMAL /HPF
TRIGL SERPL-MCNC: 60 MG/DL
TSH SERPL-ACNC: 3.43 MIU/L (ref 0.4–4.5)
WBC # BLD AUTO: 4.9 THOUSAND/UL (ref 3.8–10.8)
WBC #/AREA URNS HPF: ABNORMAL /HPF

## 2025-01-30 ENCOUNTER — HOSPITAL ENCOUNTER (OUTPATIENT)
Dept: RADIOLOGY | Facility: CLINIC | Age: 85
Discharge: HOME | End: 2025-01-30
Payer: MEDICARE

## 2025-01-30 DIAGNOSIS — K70.31 ALCOHOLIC CIRRHOSIS OF LIVER WITH ASCITES (MULTI): ICD-10-CM

## 2025-01-30 DIAGNOSIS — K82.4 GALLBLADDER POLYP: ICD-10-CM

## 2025-01-30 PROCEDURE — 76705 ECHO EXAM OF ABDOMEN: CPT

## 2025-02-04 ENCOUNTER — TELEPHONE (OUTPATIENT)
Dept: PRIMARY CARE | Facility: CLINIC | Age: 85
End: 2025-02-04

## 2025-02-04 NOTE — TELEPHONE ENCOUNTER
Patient called stated she was told by pcp to call if legs continue to swell right back if leg is tender and sore to touch and bothe legs are swelling at the ankle.    Please Advise, TY

## 2025-02-05 ENCOUNTER — OFFICE VISIT (OUTPATIENT)
Dept: GASTROENTEROLOGY | Facility: HOSPITAL | Age: 85
End: 2025-02-05
Payer: MEDICARE

## 2025-02-05 VITALS
TEMPERATURE: 97.5 F | BODY MASS INDEX: 20.42 KG/M2 | WEIGHT: 104 LBS | RESPIRATION RATE: 16 BRPM | HEIGHT: 60 IN | SYSTOLIC BLOOD PRESSURE: 135 MMHG | HEART RATE: 76 BPM | DIASTOLIC BLOOD PRESSURE: 64 MMHG

## 2025-02-05 DIAGNOSIS — K70.31 ALCOHOLIC CIRRHOSIS OF LIVER WITH ASCITES (MULTI): ICD-10-CM

## 2025-02-05 DIAGNOSIS — K82.4 GALLBLADDER POLYP: Primary | ICD-10-CM

## 2025-02-05 DIAGNOSIS — K70.9 ALCOHOLIC LIVER DISEASE: ICD-10-CM

## 2025-02-05 DIAGNOSIS — Z71.2 ENCOUNTER TO DISCUSS TEST RESULTS: ICD-10-CM

## 2025-02-05 PROCEDURE — 3078F DIAST BP <80 MM HG: CPT | Performed by: INTERNAL MEDICINE

## 2025-02-05 PROCEDURE — 99214 OFFICE O/P EST MOD 30 MIN: CPT | Performed by: INTERNAL MEDICINE

## 2025-02-05 PROCEDURE — 1126F AMNT PAIN NOTED NONE PRSNT: CPT | Performed by: INTERNAL MEDICINE

## 2025-02-05 PROCEDURE — 1123F ACP DISCUSS/DSCN MKR DOCD: CPT | Performed by: INTERNAL MEDICINE

## 2025-02-05 PROCEDURE — G2211 COMPLEX E/M VISIT ADD ON: HCPCS | Performed by: INTERNAL MEDICINE

## 2025-02-05 PROCEDURE — 3075F SYST BP GE 130 - 139MM HG: CPT | Performed by: INTERNAL MEDICINE

## 2025-02-05 PROCEDURE — 1159F MED LIST DOCD IN RCRD: CPT | Performed by: INTERNAL MEDICINE

## 2025-02-05 RX ORDER — LACTULOSE 10 G/15ML
30 SOLUTION ORAL DAILY
Qty: 1350 ML | Refills: 11 | Status: SHIPPED | OUTPATIENT
Start: 2025-02-05 | End: 2026-02-05

## 2025-02-05 ASSESSMENT — PAIN SCALES - GENERAL: PAINLEVEL_OUTOF10: 0-NO PAIN

## 2025-02-05 NOTE — PROGRESS NOTES
Subjective     Cirrhosis follow up visit.  Review of recent US.    History of Present Illness:   Yulia Fonseca is a 84 y.o. female who presents to the [facility] at [facility] for alcohol related cirrhosis.    Doing well on lactulose - takes once or twice per day.  Continues Rifaximin.  Also takes pantoprazole.    Had a liver US for HCC screening, 5/22/24.  The radiologist identified concerning changes in gallbladder polyps:    GALLBLADDER:  The gallbladder is distended  The gallbladder wall is thickened and laminated. This was seen  previously on 01/17/2020 There are no gallstones.  There is no sludge.  There is no pericholecystic fluid.  There is a 0.5 cm nonshadowing echogenic area in the proximal body of  the gallbladder which could represent a pedunculated polyp. On  01/17/2024, this measured 0.5 cm This was not seen on 07/07/2023.  On 12/08/2022, this measured 0.5 cm.  This was not seen on 03/07/2022  There is a 1.1 cm bilobed nonshadowing echogenic foci in the midbody  of the gallbladder. This could represent a sessile polyp. On  01/17/2024, this measured 1.2 cm. On 07/07/2023, this measured 1.2 cm.  On 12/08/2022, this measured 0.9 cm.  On 03/07/2022, this measured 0.8 cm.  On 07/08/2021, this measured 0.7 cm  On 08/05/2020, this measured 0.5 cm.    Her US was reviewed at  with the recommendation to obtain a MRI.  MRI was completed on 7/29/24 and subsequently reviewed at the [facility], again:    GALLBLADDER:  12 mm slightly enhancing lesion within the gallbladder body (series 2, image 41, series 36, image 33). A smaller 3 mm polyp is noted at the fundus (series 36, image 51).    We discussed the findings and differential diagnosis of these gallbladder findings including stones/sludge, an adenoma or even a adenocarcinoma. We also discussed cholecystectomy. She has made an informed decision against surgery. We have decided to continue to follow for changes with US.      Review of Systems  Review of Systems   All other systems reviewed and are negative.    Treated for an abscess on face in Sept / Oct.  2023.  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), Dietary counseling and surveillance (04/03/2019), Encounter for screening mammogram for malignant neoplasm of breast, Epistaxis (03/19/2018), Hepatic encephalopathy (03/24/2017), Immunization not carried out because of patient refusal, Localized edema (02/10/2016), Other conditions influencing health status, Other specified abnormal findings of blood chemistry (12/08/2016), Other specified disorders of nose and nasal sinuses (08/26/2016), Other specified disorders of nose and nasal sinuses (01/09/2018), Other symptoms and signs concerning food and fluid intake (04/03/2019), Pain in right arm (09/11/2017), Pain in right toe(s) (08/26/2016), Personal history of diseases of the skin and subcutaneous tissue (01/09/2018), Personal history of other diseases of the circulatory system (10/26/2015), Personal history of other diseases of the digestive system (04/03/2019), Personal history of other diseases of the nervous system and sense organs (10/26/2015), Personal history of other diseases of the nervous system and sense organs, Personal history of other endocrine, nutritional and metabolic disease (09/29/2017), Personal history of other endocrine, nutritional and metabolic disease (01/17/2018), Personal history of other infectious and parasitic diseases, Personal history of other mental and behavioral disorders (03/24/2019), Personal history of other mental and behavioral disorders (02/04/2016), Personal history of other specified conditions (11/09/2018), Preglaucoma, unspecified, unspecified eye (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     Social History   reports that she has quit smoking. Her smoking use  "included cigarettes. She has never been exposed to tobacco smoke. She has never used smokeless tobacco. She reports that she does not currently use alcohol. She reports that she does not use drugs.     Family History  family history includes Cancer in her sister; Coranary Arteriosclerosis in her father; Coronary artery disease in her father; Thromboenbolic Disease in her mother.     Allergies  No Known Allergies    Medications  Current Outpatient Medications   Medication Instructions    alcohol swabs (BD Alcohol Swabs) pads, medicated Use as directed daily for checking blood sugars    amLODIPine (NORVASC) 10 mg, oral, Daily    artificial tears, dextran-hypomel-glycerin, 0.1-0.3-0.2 % ophthalmic solution 4 times daily    BD Indu 2nd Gen Pen Needle 32 gauge x 5/32\" needle Use daily with insulin for DM    blood glucose control, low (True Metrix Level 1) solution Use as directed to check blood sugars    blood sugar diagnostic (OneTouch Verio test strips) strip Use to check BS twice daily    blood sugar diagnostic (True Metrix Glucose Test Strip) strip Use as directed daily to help check blood sugars    blood-glucose meter (True Metrix Air Glucose Meter) misc Use as directed daily to help check blood sugars    blood-glucose sensor (Dexcom G7 Sensor) device Use often to check your sugars- change every 10days    calcium carbonate (Oscal) 500 mg calcium (1,250 mg) tablet Daily RT    cetirizine (ZYRTEC) 10 mg, oral, Daily    cholecalciferol (VITAMIN D-3) 50 mcg, Daily    Dexcom G4 platinum  (Dexcom G7 ) misc Use as instructed for blood sugars    dextran 70-hypromellose drops 1-2 drops, 4 times daily    dorzolamide-timoloL (Cosopt) 22.3-6.8 mg/mL ophthalmic solution 1 drop, Both Eyes, 2 times daily    FreeStyle Jason sensor system (FreeStyle Jason 2 Sensor) kit Use as instructed to check your BS often every 14 days change    lactulose 10 gram/15 mL (15 mL) solution 30 mL, oral, Daily    lancets (TRUEplus " "Lancets) 33 gauge misc Use as directed daily to help check blood sugars    lancets misc Use to check BS twice daily    Lantus Solostar U-100 Insulin 4 Units, subcutaneous, Every morning    latanoprost (Xalatan) 0.005 % ophthalmic solution 1 drop, Both Eyes, Nightly    losartan (COZAAR) 25 mg, oral, Daily    mirtazapine (Remeron) 7.5 mg tablet Take by mouth.    multivitamin tablet 1 tablet, Daily    mupirocin (Bactroban) 2 % ointment     pantoprazole (PROTONIX) 40 mg, oral, Daily before breakfast, Do not crush, chew, or split.    rifAXIMin (Xifaxan) 550 mg tablet TAKE ONE (1) TABLET BY MOUTH TWICE DAILY.    triamcinolone (Kenalog) 0.1 % ointment 1 Application, 2 times daily        Objective   Visit Vitals  /64   Pulse 76   Temp 36.4 °C (97.5 °F)   Resp 16          1/24/2024    10:47 AM 7/18/2024     2:13 PM 7/29/2024    10:32 AM 8/1/2024    11:04 AM 8/21/2024     1:03 PM 1/28/2025     9:41 AM 2/5/2025     2:22 PM   Vitals   Systolic 120 138  110 132 120 135   Diastolic 60 60  50 56 70 64   Heart Rate 65 78  78 65 85 76   Temp 35.9 °C (96.6 °F) 36.1 °C (97 °F)  36.1 °C (97 °F) 36.1 °C (97 °F) 36.1 °C (97 °F) 36.4 °C (97.5 °F)   Resp 14 15  15  14 16   Height 1.549 m (5' 1\") 1.549 m (5' 1\") 1.5 m (4' 11.06\") 1.5 m (4' 11.06\")  1.549 m (5' 1\") 1.524 m (5')   Weight (lb) 108.1 102.1 102 102 103.2 102 104   BMI 20.43 kg/m2 19.29 kg/m2 20.56 kg/m2 20.56 kg/m2 20.8 kg/m2 19.27 kg/m2 20.31 kg/m2   BSA (m2) 1.45 m2 1.41 m2 1.39 m2 1.39 m2 1.4 m2 1.41 m2 1.41 m2   Visit Report Report Report   Report Report Report     Physical Exam  Vitals reviewed.   Constitutional:       General: She is awake.   Cardiovascular:      Rate and Rhythm: Normal rate and regular rhythm.   Pulmonary:      Effort: Pulmonary effort is normal.      Breath sounds: Normal breath sounds.   Neurological:      Mental Status: She is alert and oriented to person, place, and time.   Psychiatric:         Attention and Perception: Attention and perception " normal.         Behavior: Behavior normal.     Labs    WHITE BLOOD CELL COUNT   Date/Time Value Ref Range Status   01/28/2025 12:06 PM 4.9 3.8 - 10.8 Thousand/uL Final     HEMOGLOBIN   Date/Time Value Ref Range Status   01/28/2025 12:06 PM 11.6 (L) 11.7 - 15.5 g/dL Final     HEMATOCRIT   Date/Time Value Ref Range Status   01/28/2025 12:06 PM 38.3 35.0 - 45.0 % Final     MCV   Date/Time Value Ref Range Status   01/28/2025 12:06 PM 88.9 80.0 - 100.0 fL Final     PLATELET COUNT   Date/Time Value Ref Range Status   01/28/2025 12:06  140 - 400 Thousand/uL Final        PROTEIN, TOTAL   Date/Time Value Ref Range Status   01/28/2025 12:06 PM 8.0 6.1 - 8.1 g/dL Final     ALBUMIN   Date/Time Value Ref Range Status   01/28/2025 12:06 PM 4.6 3.6 - 5.1 g/dL Final     AST   Date/Time Value Ref Range Status   01/28/2025 12:06 PM 33 10 - 35 U/L Final     ALT   Date/Time Value Ref Range Status   01/28/2025 12:06 PM 21 6 - 29 U/L Final     ALKALINE PHOSPHATASE   Date/Time Value Ref Range Status   01/28/2025 12:06  37 - 153 U/L Final     BILIRUBIN, TOTAL   Date/Time Value Ref Range Status   01/28/2025 12:06 PM 0.5 0.2 - 1.2 mg/dL Final     BILIRUBIN, DIRECT   Date/Time Value Ref Range Status   01/28/2025 12:06 PM 0.1 < OR = 0.2 mg/dL Final        Vitamin D, 25-Hydroxy   Date/Time Value Ref Range Status   07/10/2020 10:33 AM 63 ng/mL Final     Comment:     .  DEFICIENCY:         < 20   NG/ML  INSUFFICIENCY:      20-29  NG/ML  SUFFICIENCY:         NG/ML    THIS ASSAY ACCURATELY QUANTIFIES THE SUM OF  VITAMIN D3, 25-HYDROXY AND VIT D2,25-HYDROXY.          AST   Date/Time Value Ref Range Status   01/28/2025 12:06 PM 33 10 - 35 U/L Final     ALT   Date/Time Value Ref Range Status   01/28/2025 12:06 PM 21 6 - 29 U/L Final     ALKALINE PHOSPHATASE   Date/Time Value Ref Range Status   01/28/2025 12:06  37 - 153 U/L Final     BILIRUBIN, TOTAL   Date/Time Value Ref Range Status   01/28/2025 12:06 PM 0.5 0.2 - 1.2 mg/dL  Final     BILIRUBIN, DIRECT   Date/Time Value Ref Range Status   01/28/2025 12:06 PM 0.1 < OR = 0.2 mg/dL Final     ALBUMIN   Date/Time Value Ref Range Status   01/28/2025 12:06 PM 4.6 3.6 - 5.1 g/dL Final     PROTEIN, TOTAL   Date/Time Value Ref Range Status   01/28/2025 12:06 PM 8.0 6.1 - 8.1 g/dL Final        Protime   Date/Time Value Ref Range Status   01/08/2024 01:01 PM 13.1 (H) 9.8 - 12.8 seconds Final     INR   Date/Time Value Ref Range Status   01/08/2024 01:01 PM 1.2 (H) 0.9 - 1.1 Final         1/30/25    US   GALLBLADDER:  The gallbladder is partially distended and contains a nodule  posteriorly measuring approximately 13 x 9 x 9 mm. This measures  larger than the prior exam. Again consider general surgery  evaluation. This is likely a polyp. Neoplasm not excluded. Also  another smaller polyp measuring approximately 16 x 5 x 4 mm and also  slightly larger since the prior exam    Assessment/Plan     Yulia Fonseca is a 84 y.o. female who presents to GI/Liver clinic for cirrhosis follow up.    Assessment:  85 y/o F with prior alcoholic hepatitis, and now stable alcohol related cirrhosis. LIver disease is stable. She remains sober from all alcohol use. She is sober and has stopped all alcohol consumption several years ago.      I had questioned whether she was actually cirrhotic (in the past). Her Fibroscan results (46 kPa) supports cirrhosis and portal hypertension.  Prior EGD several years ago with small varices indicative of portal HTN as well. Discussed the risks and benefits of repeating an EGD (> 3 years since last EGD) and after consideration the patient has declined a follow-up endoscopy.     She has had some issues with encephalopathy, taking lactulose and rifaximin. She is currently doing well on both medications with no further episodes of HE. We will continue Generlac - which she prefers.     Tubular adenoma found on colonoscopy in 2016.     Gallbladder polyps and the pancreatic duct stones noted  on recent liver US exams and confirmed on MRI. We again discussed the findings on her recent US, which appears to be a high risk polyp/lesion in her GB ~ 1.3 cm in size (which appears to have some growth over the past few years).      Recs:  - The patient doesn't want to pursue surgical options given her age and overall health. She understands the possibility of further growth and even the possibility of this polyp harboring a cancer and/or becoming a cancer in the future. I also had a discussion, whether we ought to stop checking, considering her decision not to pursue this finding. At this time, she prefers to do some follow up. We will monitor with a liver/GB US in 6 months.    I also refilled her lactulose which she will continue to take to prevent HE.    She has some MSK complaints, and I would like her bring these concerns to her PCP.    She will return in 6 months to review the results of her follow up US.    Peter Huston MD    Instructions      Peter Huston MD

## 2025-02-05 NOTE — LETTER
February 12, 2025     Yanique Bedoya DO  [address]  [place] [address]  [facility] [address]    Patient: Yulia Fonseca   YOB: 1940   Date of Visit: 2/5/2025       Dear Dr. Yanique Bedoya DO:    Thank you for referring Yulia Fonseca to me for evaluation. Below are my notes for this consultation.  If you have questions, please do not hesitate to call me. I look forward to following your patient along with you.       Sincerely,     Peter Huston MD      CC: No Recipients  ______________________________________________________________________________________    Subjective     Cirrhosis follow up visit.  Review of recent US.    History of Present Illness:   Yulia Fonseca is a 84 y.o. female who presents to the [facility] at [facility] for alcohol related cirrhosis.    Doing well on lactulose - takes once or twice per day.  Continues Rifaximin.  Also takes pantoprazole.    Had a liver US for HCC screening, 5/22/24.  The radiologist identified concerning changes in gallbladder polyps:    GALLBLADDER:  The gallbladder is distended  The gallbladder wall is thickened and laminated. This was seen  previously on 01/17/2020 There are no gallstones.  There is no sludge.  There is no pericholecystic fluid.  There is a 0.5 cm nonshadowing echogenic area in the proximal body of  the gallbladder which could represent a pedunculated polyp. On  01/17/2024, this measured 0.5 cm This was not seen on 07/07/2023.  On 12/08/2022, this measured 0.5 cm.  This was not seen on 03/07/2022  There is a 1.1 cm bilobed nonshadowing echogenic foci in the midbody  of the gallbladder. This could represent a sessile polyp. On  01/17/2024, this measured 1.2 cm. On 07/07/2023, this measured 1.2 cm.  On 12/08/2022, this measured 0.9 cm.  On 03/07/2022, this measured 0.8 cm.  On 07/08/2021, this measured 0.7 cm  On 08/05/2020, this measured 0.5 cm.    Her US was reviewed at  with the recommendation to obtain a MRI.  MRI  was completed on 7/29/24 and subsequently reviewed at the Hepatobiliary Tumor Board, again:    GALLBLADDER:  12 mm slightly enhancing lesion within the gallbladder body (series 2, image 41, series 36, image 33). A smaller 3 mm polyp is noted at the fundus (series 36, image 51).    We discussed the findings and differential diagnosis of these gallbladder findings including stones/sludge, an adenoma or even a adenocarcinoma. We also discussed cholecystectomy. She has made an informed decision against surgery. We have decided to continue to follow for changes with US.     Review of Systems  Review of Systems   All other systems reviewed and are negative.    Treated for an abscess on face in Sept / Oct.  2023.  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), Dietary counseling and surveillance (04/03/2019), Encounter for screening mammogram for malignant neoplasm of breast, Epistaxis (03/19/2018), Hepatic encephalopathy (03/24/2017), Immunization not carried out because of patient refusal, Localized edema (02/10/2016), Other conditions influencing health status, Other specified abnormal findings of blood chemistry (12/08/2016), Other specified disorders of nose and nasal sinuses (08/26/2016), Other specified disorders of nose and nasal sinuses (01/09/2018), Other symptoms and signs concerning food and fluid intake (04/03/2019), Pain in right arm (09/11/2017), Pain in right toe(s) (08/26/2016), Personal history of diseases of the skin and subcutaneous tissue (01/09/2018), Personal history of other diseases of the circulatory system (10/26/2015), Personal history of other diseases of the digestive system (04/03/2019), Personal history of other diseases of the nervous system and sense organs (10/26/2015), Personal history of other diseases of the nervous system and sense organs, Personal history of other endocrine, nutritional and metabolic  "disease (09/29/2017), Personal history of other endocrine, nutritional and metabolic disease (01/17/2018), Personal history of other infectious and parasitic diseases, Personal history of other mental and behavioral disorders (03/24/2019), Personal history of other mental and behavioral disorders (02/04/2016), Personal history of other specified conditions (11/09/2018), Preglaucoma, unspecified, unspecified eye (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     Social History   reports that she has quit smoking. Her smoking use included cigarettes. She has never been exposed to tobacco smoke. She has never used smokeless tobacco. She reports that she does not currently use alcohol. She reports that she does not use drugs.     Family History  family history includes Cancer in her sister; Coranary Arteriosclerosis in her father; Coronary artery disease in her father; Thromboenbolic Disease in her mother.     Allergies  No Known Allergies    Medications  Current Outpatient Medications   Medication Instructions   • alcohol swabs (BD Alcohol Swabs) pads, medicated Use as directed daily for checking blood sugars   • amLODIPine (NORVASC) 10 mg, oral, Daily   • artificial tears, dextran-hypomel-glycerin, 0.1-0.3-0.2 % ophthalmic solution 4 times daily   • BD Indu 2nd Gen Pen Needle 32 gauge x 5/32\" needle Use daily with insulin for DM   • blood glucose control, low (True Metrix Level 1) solution Use as directed to check blood sugars   • blood sugar diagnostic (OneTouch Verio test strips) strip Use to check BS twice daily   • blood sugar diagnostic (True Metrix Glucose Test Strip) strip Use as directed daily to help check blood sugars   • blood-glucose meter (True Metrix Air Glucose Meter) misc Use as directed daily to help check blood sugars   • blood-glucose sensor (Dexcom G7 Sensor) device Use often to check your sugars- change every 10days   • calcium carbonate (Oscal) 500 mg " "calcium (1,250 mg) tablet Daily RT   • cetirizine (ZYRTEC) 10 mg, oral, Daily   • cholecalciferol (VITAMIN D-3) 50 mcg, Daily   • Dexcom G4 platinum  (Dexcom G7 ) misc Use as instructed for blood sugars   • dextran 70-hypromellose drops 1-2 drops, 4 times daily   • dorzolamide-timoloL (Cosopt) 22.3-6.8 mg/mL ophthalmic solution 1 drop, Both Eyes, 2 times daily   • FreeStyle Jsaon sensor system (FreeStyle Jason 2 Sensor) kit Use as instructed to check your BS often every 14 days change   • lactulose 10 gram/15 mL (15 mL) solution 30 mL, oral, Daily   • lancets (TRUEplus Lancets) 33 gauge misc Use as directed daily to help check blood sugars   • lancets misc Use to check BS twice daily   • Lantus Solostar U-100 Insulin 4 Units, subcutaneous, Every morning   • latanoprost (Xalatan) 0.005 % ophthalmic solution 1 drop, Both Eyes, Nightly   • losartan (COZAAR) 25 mg, oral, Daily   • mirtazapine (Remeron) 7.5 mg tablet Take by mouth.   • multivitamin tablet 1 tablet, Daily   • mupirocin (Bactroban) 2 % ointment    • pantoprazole (PROTONIX) 40 mg, oral, Daily before breakfast, Do not crush, chew, or split.   • rifAXIMin (Xifaxan) 550 mg tablet TAKE ONE (1) TABLET BY MOUTH TWICE DAILY.   • triamcinolone (Kenalog) 0.1 % ointment 1 Application, 2 times daily        Objective   Visit Vitals  /64   Pulse 76   Temp 36.4 °C (97.5 °F)   Resp 16          1/24/2024    10:47 AM 7/18/2024     2:13 PM 7/29/2024    10:32 AM 8/1/2024    11:04 AM 8/21/2024     1:03 PM 1/28/2025     9:41 AM 2/5/2025     2:22 PM   Vitals   Systolic 120 138  110 132 120 135   Diastolic 60 60  50 56 70 64   Heart Rate 65 78  78 65 85 76   Temp 35.9 °C (96.6 °F) 36.1 °C (97 °F)  36.1 °C (97 °F) 36.1 °C (97 °F) 36.1 °C (97 °F) 36.4 °C (97.5 °F)   Resp 14 15  15  14 16   Height 1.549 m (5' 1\") 1.549 m (5' 1\") 1.5 m (4' 11.06\") 1.5 m (4' 11.06\")  1.549 m (5' 1\") 1.524 m (5')   Weight (lb) 108.1 102.1 102 102 103.2 102 104   BMI 20.43 kg/m2 " 19.29 kg/m2 20.56 kg/m2 20.56 kg/m2 20.8 kg/m2 19.27 kg/m2 20.31 kg/m2   BSA (m2) 1.45 m2 1.41 m2 1.39 m2 1.39 m2 1.4 m2 1.41 m2 1.41 m2   Visit Report Report Report   Report Report Report     Physical Exam  Vitals reviewed.   Constitutional:       General: She is awake.   Cardiovascular:      Rate and Rhythm: Normal rate and regular rhythm.   Pulmonary:      Effort: Pulmonary effort is normal.      Breath sounds: Normal breath sounds.   Neurological:      Mental Status: She is alert and oriented to person, place, and time.   Psychiatric:         Attention and Perception: Attention and perception normal.         Behavior: Behavior normal.     Labs    WHITE BLOOD CELL COUNT   Date/Time Value Ref Range Status   01/28/2025 12:06 PM 4.9 3.8 - 10.8 Thousand/uL Final     HEMOGLOBIN   Date/Time Value Ref Range Status   01/28/2025 12:06 PM 11.6 (L) 11.7 - 15.5 g/dL Final     HEMATOCRIT   Date/Time Value Ref Range Status   01/28/2025 12:06 PM 38.3 35.0 - 45.0 % Final     MCV   Date/Time Value Ref Range Status   01/28/2025 12:06 PM 88.9 80.0 - 100.0 fL Final     PLATELET COUNT   Date/Time Value Ref Range Status   01/28/2025 12:06  140 - 400 Thousand/uL Final        PROTEIN, TOTAL   Date/Time Value Ref Range Status   01/28/2025 12:06 PM 8.0 6.1 - 8.1 g/dL Final     ALBUMIN   Date/Time Value Ref Range Status   01/28/2025 12:06 PM 4.6 3.6 - 5.1 g/dL Final     AST   Date/Time Value Ref Range Status   01/28/2025 12:06 PM 33 10 - 35 U/L Final     ALT   Date/Time Value Ref Range Status   01/28/2025 12:06 PM 21 6 - 29 U/L Final     ALKALINE PHOSPHATASE   Date/Time Value Ref Range Status   01/28/2025 12:06  37 - 153 U/L Final     BILIRUBIN, TOTAL   Date/Time Value Ref Range Status   01/28/2025 12:06 PM 0.5 0.2 - 1.2 mg/dL Final     BILIRUBIN, DIRECT   Date/Time Value Ref Range Status   01/28/2025 12:06 PM 0.1 < OR = 0.2 mg/dL Final        Vitamin D, 25-Hydroxy   Date/Time Value Ref Range Status   07/10/2020 10:33 AM 63  ng/mL Final     Comment:     .  DEFICIENCY:         < 20   NG/ML  INSUFFICIENCY:      20-29  NG/ML  SUFFICIENCY:         NG/ML    THIS ASSAY ACCURATELY QUANTIFIES THE SUM OF  VITAMIN D3, 25-HYDROXY AND VIT D2,25-HYDROXY.          AST   Date/Time Value Ref Range Status   01/28/2025 12:06 PM 33 10 - 35 U/L Final     ALT   Date/Time Value Ref Range Status   01/28/2025 12:06 PM 21 6 - 29 U/L Final     ALKALINE PHOSPHATASE   Date/Time Value Ref Range Status   01/28/2025 12:06  37 - 153 U/L Final     BILIRUBIN, TOTAL   Date/Time Value Ref Range Status   01/28/2025 12:06 PM 0.5 0.2 - 1.2 mg/dL Final     BILIRUBIN, DIRECT   Date/Time Value Ref Range Status   01/28/2025 12:06 PM 0.1 < OR = 0.2 mg/dL Final     ALBUMIN   Date/Time Value Ref Range Status   01/28/2025 12:06 PM 4.6 3.6 - 5.1 g/dL Final     PROTEIN, TOTAL   Date/Time Value Ref Range Status   01/28/2025 12:06 PM 8.0 6.1 - 8.1 g/dL Final        Protime   Date/Time Value Ref Range Status   01/08/2024 01:01 PM 13.1 (H) 9.8 - 12.8 seconds Final     INR   Date/Time Value Ref Range Status   01/08/2024 01:01 PM 1.2 (H) 0.9 - 1.1 Final         1/30/25    US   GALLBLADDER:  The gallbladder is partially distended and contains a nodule  posteriorly measuring approximately 13 x 9 x 9 mm. This measures  larger than the prior exam. Again consider general surgery  evaluation. This is likely a polyp. Neoplasm not excluded. Also  another smaller polyp measuring approximately 16 x 5 x 4 mm and also  slightly larger since the prior exam    Assessment/Plan     Yulia Fonseca is a 84 y.o. female who presents to GI/Liver clinic for cirrhosis follow up.    Assessment:  83 y/o F with prior alcoholic hepatitis, and now stable alcohol related cirrhosis. LIver disease is stable. She remains sober from all alcohol use. She is sober and has stopped all alcohol consumption several years ago.      I had questioned whether she was actually cirrhotic (in the past). Her Fibroscan results  (46 kPa) supports cirrhosis and portal hypertension.  Prior EGD several years ago with small varices indicative of portal HTN as well. Discussed the risks and benefits of repeating an EGD (> 3 years since last EGD) and after consideration the patient has declined a follow-up endoscopy.     She has had some issues with encephalopathy, taking lactulose and rifaximin. She is currently doing well on both medications with no further episodes of HE. We will continue Generlac - which she prefers.     Tubular adenoma found on colonoscopy in 2016.     Gallbladder polyps and the pancreatic duct stones noted on recent liver US exams and confirmed on MRI. We again discussed the findings on her recent US, which appears to be a high risk polyp/lesion in her GB ~ 1.3 cm in size (which appears to have some growth over the past few years).      Recs:  - The patient doesn't want to pursue surgical options given her age and overall health. She understands the possibility of further growth and even the possibility of this polyp harboring a cancer and/or becoming a cancer in the future. I also had a discussion, whether we ought to stop checking, considering her decision not to pursue this finding. At this time, she prefers to do some follow up. We will monitor with a liver/GB US in 6 months.    I also refilled her lactulose which she will continue to take to prevent HE.    She has some MSK complaints, and I would like her bring these concerns to her PCP.    She will return in 6 months to review the results of her follow up US.    Peter Huston MD    Instructions      Peter Huston MD

## 2025-02-05 NOTE — PATIENT INSTRUCTIONS
Welcome to Dr. [facility].  Dr. Huston sees patients at the following sites:  [place] [address] [facility] at [facility] [place], operated by [facility], [address] at [facility] [place] [address]    Dr. Huston's hepatology care coordinator, Rajani SUAZO, can be reached at [phone].  Dr. Huston's , Socorro Rivera, can be reached at [phone].     We discussed your US findings, and decided to repeat in 6 months.  Continue to follow up with your PCP regarding some of the other issues you brought up today.

## 2025-02-06 ENCOUNTER — APPOINTMENT (OUTPATIENT)
Dept: OPHTHALMOLOGY | Facility: CLINIC | Age: 85
End: 2025-02-06
Payer: MEDICARE

## 2025-02-06 DIAGNOSIS — H40.1131 CHRONIC OPEN ANGLE GLAUCOMA OF BOTH EYES, MILD STAGE: Primary | ICD-10-CM

## 2025-02-06 PROCEDURE — 99214 OFFICE O/P EST MOD 30 MIN: CPT | Performed by: OPHTHALMOLOGY

## 2025-02-06 PROCEDURE — 92133 CPTRZD OPH DX IMG PST SGM ON: CPT | Performed by: OPHTHALMOLOGY

## 2025-02-06 PROCEDURE — G2211 COMPLEX E/M VISIT ADD ON: HCPCS | Performed by: OPHTHALMOLOGY

## 2025-02-06 RX ORDER — DORZOLAMIDE HYDROCHLORIDE AND TIMOLOL MALEATE 20; 5 MG/ML; MG/ML
1 SOLUTION/ DROPS OPHTHALMIC 2 TIMES DAILY
Qty: 30 ML | Refills: 3 | Status: SHIPPED | OUTPATIENT
Start: 2025-02-06 | End: 2026-02-06

## 2025-02-06 RX ORDER — LATANOPROST 50 UG/ML
1 SOLUTION/ DROPS OPHTHALMIC NIGHTLY
Qty: 7.5 ML | Refills: 3 | Status: SHIPPED | OUTPATIENT
Start: 2025-02-06 | End: 2026-02-06

## 2025-02-06 ASSESSMENT — CONF VISUAL FIELD
OD_NORMAL: 1
OS_INFERIOR_TEMPORAL_RESTRICTION: 0
OD_INFERIOR_TEMPORAL_RESTRICTION: 0
OS_SUPERIOR_NASAL_RESTRICTION: 0
OS_INFERIOR_NASAL_RESTRICTION: 0
OD_SUPERIOR_TEMPORAL_RESTRICTION: 0
OD_SUPERIOR_NASAL_RESTRICTION: 0
OS_SUPERIOR_TEMPORAL_RESTRICTION: 0
OD_INFERIOR_NASAL_RESTRICTION: 0
OS_NORMAL: 1

## 2025-02-06 ASSESSMENT — ENCOUNTER SYMPTOMS
MUSCULOSKELETAL NEGATIVE: 0
CARDIOVASCULAR NEGATIVE: 0
EYES NEGATIVE: 1
PSYCHIATRIC NEGATIVE: 0
RESPIRATORY NEGATIVE: 0
ALLERGIC/IMMUNOLOGIC NEGATIVE: 0
GASTROINTESTINAL NEGATIVE: 0
HEMATOLOGIC/LYMPHATIC NEGATIVE: 0
NEUROLOGICAL NEGATIVE: 0
ENDOCRINE NEGATIVE: 0
CONSTITUTIONAL NEGATIVE: 0

## 2025-02-06 ASSESSMENT — VISUAL ACUITY
METHOD: SNELLEN - LINEAR
OS_SC: 20/40
OD_SC+: -1
OD_SC: 20/40

## 2025-02-06 ASSESSMENT — EXTERNAL EXAM - RIGHT EYE: OD_EXAM: DERMATOCHALASIS

## 2025-02-06 ASSESSMENT — TONOMETRY
IOP_METHOD: GOLDMANN APPLANATION
OS_IOP_MMHG: 13
OD_IOP_MMHG: 15

## 2025-02-06 ASSESSMENT — EXTERNAL EXAM - LEFT EYE: OS_EXAM: DERMATOCHALASIS

## 2025-02-06 ASSESSMENT — CUP TO DISC RATIO
OD_RATIO: 0.75
OS_RATIO: 0.8

## 2025-02-06 NOTE — PROGRESS NOTES
primary open angle glaucoma,  moderate OS, mild OD   s/p express shunt OS with Dr. Valiente   tmax in our chart 24/25   IOP has been stable for several years on current regimen  Strong Visual Field - OU - Both Eyes          Nsd os  No evidence of glaucoma OU          OCT, Optic Nerve - OU - Both Eyes          Artifactual os>od, Thinning os>od, STABLE TO PRIOR OU           IOP great OU and stable   ok to monitor   cpm of cosopt BID latan qhs OU   rtc 6 months for HVF 24-2 and IOP check      dry eye: OS>OD   has intermittent visual disturbance    continue atifiicial tears QID and PF AT PRN      Pseudophakia OU: mild PCO  D/w patient yag cap, she wishes to defer for now   Monitor

## 2025-02-07 NOTE — TELEPHONE ENCOUNTER
Patient stated her lower legs are still swollen but no pain or redness. She does have them elevated and she only wears the compression stocking when she leaves home which she said is not often.

## 2025-02-07 NOTE — TELEPHONE ENCOUNTER
Pls call and check on her swelling  Have her elevate them and compression stockings we suggested in the office    If they have pain or redness she needs UC or ER evaluation

## 2025-02-18 PROCEDURE — RXMED WILLOW AMBULATORY MEDICATION CHARGE

## 2025-02-19 ENCOUNTER — PHARMACY VISIT (OUTPATIENT)
Dept: PHARMACY | Facility: CLINIC | Age: 85
End: 2025-02-19
Payer: COMMERCIAL

## 2025-02-23 DIAGNOSIS — N18.2 TYPE 2 DIABETES MELLITUS WITH STAGE 2 CHRONIC KIDNEY DISEASE AND HYPERTENSION (MULTI): ICD-10-CM

## 2025-02-23 DIAGNOSIS — E11.22 TYPE 2 DIABETES MELLITUS WITH STAGE 2 CHRONIC KIDNEY DISEASE AND HYPERTENSION (MULTI): ICD-10-CM

## 2025-02-23 DIAGNOSIS — I12.9 TYPE 2 DIABETES MELLITUS WITH STAGE 2 CHRONIC KIDNEY DISEASE AND HYPERTENSION (MULTI): ICD-10-CM

## 2025-02-26 RX ORDER — ISOPROPYL ALCOHOL 70 ML/100ML
SWAB TOPICAL
Qty: 100 EACH | Refills: 3 | Status: SHIPPED | OUTPATIENT
Start: 2025-02-26

## 2025-02-28 ENCOUNTER — APPOINTMENT (OUTPATIENT)
Dept: RADIOLOGY | Facility: CLINIC | Age: 85
End: 2025-02-28
Payer: MEDICARE

## 2025-03-20 PROCEDURE — RXMED WILLOW AMBULATORY MEDICATION CHARGE

## 2025-03-21 ENCOUNTER — PHARMACY VISIT (OUTPATIENT)
Dept: PHARMACY | Facility: CLINIC | Age: 85
End: 2025-03-21
Payer: COMMERCIAL

## 2025-03-27 ENCOUNTER — TELEPHONE (OUTPATIENT)
Dept: GASTROENTEROLOGY | Facility: CLINIC | Age: 85
End: 2025-03-27
Payer: MEDICARE

## 2025-03-27 DIAGNOSIS — K21.9 CHRONIC GERD: ICD-10-CM

## 2025-03-27 NOTE — TELEPHONE ENCOUNTER
"Hepatology Nurse Coordinator Note  Patient left a voicemail that \"she made a mistake on her liver ultrasound\" and is requesting a call back to discuss.     ADDENDUM 3/27/2025 2:24 PM  Called patient back. No answer. No voicemail picked up. Will reattempt later.   "

## 2025-04-08 NOTE — TELEPHONE ENCOUNTER
"Hepatology Nurse Coordinator Note   Returned call to patient. Patient states \"her ultrasound date was changed.\" Patient is aware her abdominal ultrasound is due in July 2025. She's aware she is scheduled for it on 7/31/2025 and has a follow up with Dr. Huston on 8/13/25 at 1:00 pm. Patient verbalized understanding. During call, patient asked if Dr. Huston would refill her pantoprazole as she was having difficulty getting a refill. She's aware I would ask Dr. Huston to send. She's aware I will reach out if any issues with him refilling it, otherwise she should follow up with her PCP. Patient verbalized understanding.   "

## 2025-04-11 DIAGNOSIS — K21.9 CHRONIC GERD: ICD-10-CM

## 2025-04-11 RX ORDER — PANTOPRAZOLE SODIUM 40 MG/1
40 TABLET, DELAYED RELEASE ORAL
Qty: 30 TABLET | Refills: 11 | Status: SHIPPED | OUTPATIENT
Start: 2025-04-11 | End: 2026-04-11

## 2025-04-14 PROCEDURE — RXMED WILLOW AMBULATORY MEDICATION CHARGE

## 2025-04-14 NOTE — TELEPHONE ENCOUNTER
Hepatology Nurse Coordinator Note  Called patient back. She was made aware Dr. Huston sent her refill for Pantoprazole. Patient confirmed she picked up her prescription. She had no further questions, or concerns at this time.

## 2025-04-18 ENCOUNTER — PHARMACY VISIT (OUTPATIENT)
Dept: PHARMACY | Facility: CLINIC | Age: 85
End: 2025-04-18
Payer: COMMERCIAL

## 2025-04-22 DIAGNOSIS — E11.22 TYPE 2 DIABETES MELLITUS WITH STAGE 2 CHRONIC KIDNEY DISEASE AND HYPERTENSION (MULTI): ICD-10-CM

## 2025-04-22 DIAGNOSIS — N18.2 TYPE 2 DIABETES MELLITUS WITH STAGE 2 CHRONIC KIDNEY DISEASE AND HYPERTENSION (MULTI): ICD-10-CM

## 2025-04-22 DIAGNOSIS — I12.9 TYPE 2 DIABETES MELLITUS WITH STAGE 2 CHRONIC KIDNEY DISEASE AND HYPERTENSION (MULTI): ICD-10-CM

## 2025-04-22 DIAGNOSIS — Z79.4 TYPE 2 DIABETES MELLITUS WITH HYPERGLYCEMIA, WITH LONG-TERM CURRENT USE OF INSULIN: ICD-10-CM

## 2025-04-22 DIAGNOSIS — I10 BENIGN ESSENTIAL HYPERTENSION: ICD-10-CM

## 2025-04-22 DIAGNOSIS — E11.65 TYPE 2 DIABETES MELLITUS WITH HYPERGLYCEMIA, WITH LONG-TERM CURRENT USE OF INSULIN: ICD-10-CM

## 2025-04-23 RX ORDER — PEN NEEDLE, DIABETIC 30 GX3/16"
NEEDLE, DISPOSABLE MISCELLANEOUS
Qty: 100 EACH | Refills: 3 | Status: SHIPPED | OUTPATIENT
Start: 2025-04-23

## 2025-04-23 RX ORDER — AMLODIPINE BESYLATE 10 MG/1
10 TABLET ORAL DAILY
Qty: 90 TABLET | Refills: 3 | Status: SHIPPED | OUTPATIENT
Start: 2025-04-23

## 2025-04-23 RX ORDER — INSULIN GLARGINE 100 [IU]/ML
4 INJECTION, SOLUTION SUBCUTANEOUS EVERY MORNING
Qty: 3 EACH | Refills: 3 | Status: SHIPPED | OUTPATIENT
Start: 2025-04-23

## 2025-04-23 RX ORDER — CALCIUM CITRATE/VITAMIN D3 200MG-6.25
TABLET ORAL
Qty: 100 STRIP | Refills: 3 | Status: SHIPPED | OUTPATIENT
Start: 2025-04-23

## 2025-05-13 PROCEDURE — RXMED WILLOW AMBULATORY MEDICATION CHARGE

## 2025-05-15 ENCOUNTER — PHARMACY VISIT (OUTPATIENT)
Dept: PHARMACY | Facility: CLINIC | Age: 85
End: 2025-05-15
Payer: COMMERCIAL

## 2025-05-22 DIAGNOSIS — I12.9 TYPE 2 DIABETES MELLITUS WITH STAGE 2 CHRONIC KIDNEY DISEASE AND HYPERTENSION (MULTI): ICD-10-CM

## 2025-05-22 DIAGNOSIS — E11.22 TYPE 2 DIABETES MELLITUS WITH STAGE 2 CHRONIC KIDNEY DISEASE AND HYPERTENSION (MULTI): ICD-10-CM

## 2025-05-22 DIAGNOSIS — N18.2 TYPE 2 DIABETES MELLITUS WITH STAGE 2 CHRONIC KIDNEY DISEASE AND HYPERTENSION (MULTI): ICD-10-CM

## 2025-05-22 NOTE — TELEPHONE ENCOUNTER
Patient needs refil only has 3 left lancets (TRUEplus Lancets) 33 gauge [id number] - [place], [place] - [address] AT [facility] [address] Phone: [phone]   Fax: [phone]

## 2025-05-28 RX ORDER — LANCETS 33 GAUGE
EACH MISCELLANEOUS
Qty: 200 EACH | Refills: 3 | Status: SHIPPED | OUTPATIENT
Start: 2025-05-28

## 2025-06-10 DIAGNOSIS — Z79.4 TYPE 2 DIABETES MELLITUS WITH HYPERGLYCEMIA, WITH LONG-TERM CURRENT USE OF INSULIN: ICD-10-CM

## 2025-06-10 DIAGNOSIS — E11.65 TYPE 2 DIABETES MELLITUS WITH HYPERGLYCEMIA, WITH LONG-TERM CURRENT USE OF INSULIN: ICD-10-CM

## 2025-06-12 DIAGNOSIS — K70.31 ALCOHOLIC CIRRHOSIS OF LIVER WITH ASCITES (MULTI): ICD-10-CM

## 2025-06-12 PROCEDURE — RXMED WILLOW AMBULATORY MEDICATION CHARGE

## 2025-06-12 NOTE — TELEPHONE ENCOUNTER
Hepatology Nurse Coordinator Note - Prescription Refill Request  Medication Requested: Xifaxan  Last Office Visit with Dr. Huston: 2/5/2025  Upcoming Office Visit with Dr. Huston: 8/20/2025  Pharmacy[facility]  Will forward request to Dr. Huston for approval.

## 2025-06-13 ENCOUNTER — PHARMACY VISIT (OUTPATIENT)
Dept: PHARMACY | Facility: CLINIC | Age: 85
End: 2025-06-13
Payer: COMMERCIAL

## 2025-06-16 NOTE — TELEPHONE ENCOUNTER
Patient states she has 5 days left tell she is completely out of her rx for losartan (Cozaar) 25 mg tablet.  Pharmacy is still waiting for rx to be sent over.     Thank you.     [id number] - [place], [place] - [address] AT [facility] [address] Phone: [phone]   Fax: [phone]

## 2025-06-17 RX ORDER — LOSARTAN POTASSIUM 25 MG/1
25 TABLET ORAL DAILY
Qty: 90 TABLET | Refills: 3 | Status: SHIPPED | OUTPATIENT
Start: 2025-06-17

## 2025-07-09 ENCOUNTER — APPOINTMENT (OUTPATIENT)
Dept: GASTROENTEROLOGY | Facility: HOSPITAL | Age: 85
End: 2025-07-09
Payer: MEDICARE

## 2025-07-10 PROCEDURE — RXMED WILLOW AMBULATORY MEDICATION CHARGE

## 2025-07-15 ENCOUNTER — PHARMACY VISIT (OUTPATIENT)
Dept: PHARMACY | Facility: CLINIC | Age: 85
End: 2025-07-15
Payer: COMMERCIAL

## 2025-07-16 ENCOUNTER — TELEPHONE (OUTPATIENT)
Dept: GASTROENTEROLOGY | Facility: HOSPITAL | Age: 85
End: 2025-07-16
Payer: MEDICARE

## 2025-07-16 DIAGNOSIS — K21.9 CHRONIC GERD: ICD-10-CM

## 2025-07-16 RX ORDER — PANTOPRAZOLE SODIUM 40 MG/1
40 TABLET, DELAYED RELEASE ORAL
Qty: 30 TABLET | Refills: 11 | Status: SHIPPED | OUTPATIENT
Start: 2025-07-16 | End: 2026-07-16

## 2025-07-16 NOTE — TELEPHONE ENCOUNTER
Patient called asking for a refill on medication.    Pantoprazole - 40 mg    She asked for it to be sent to [facility] pharmacy listed in chart.

## 2025-07-21 ENCOUNTER — APPOINTMENT (OUTPATIENT)
Dept: PRIMARY CARE | Facility: CLINIC | Age: 85
End: 2025-07-21
Payer: MEDICARE

## 2025-07-31 ENCOUNTER — HOSPITAL ENCOUNTER (OUTPATIENT)
Dept: RADIOLOGY | Facility: CLINIC | Age: 85
Discharge: HOME | End: 2025-07-31
Payer: MEDICARE

## 2025-07-31 DIAGNOSIS — K82.4 GALLBLADDER POLYP: ICD-10-CM

## 2025-07-31 DIAGNOSIS — K70.31 ALCOHOLIC CIRRHOSIS OF LIVER WITH ASCITES (MULTI): ICD-10-CM

## 2025-07-31 PROCEDURE — 76705 ECHO EXAM OF ABDOMEN: CPT | Performed by: RADIOLOGY

## 2025-07-31 PROCEDURE — 76705 ECHO EXAM OF ABDOMEN: CPT

## 2025-08-07 PROCEDURE — RXMED WILLOW AMBULATORY MEDICATION CHARGE

## 2025-08-08 ENCOUNTER — PHARMACY VISIT (OUTPATIENT)
Dept: PHARMACY | Facility: CLINIC | Age: 85
End: 2025-08-08
Payer: COMMERCIAL

## 2025-08-13 ENCOUNTER — APPOINTMENT (OUTPATIENT)
Dept: GASTROENTEROLOGY | Facility: HOSPITAL | Age: 85
End: 2025-08-13
Payer: MEDICARE

## 2025-08-14 ENCOUNTER — APPOINTMENT (OUTPATIENT)
Dept: OPHTHALMOLOGY | Facility: CLINIC | Age: 85
End: 2025-08-14
Payer: MEDICARE

## 2025-08-20 ENCOUNTER — OFFICE VISIT (OUTPATIENT)
Dept: GASTROENTEROLOGY | Facility: HOSPITAL | Age: 85
End: 2025-08-20
Payer: MEDICARE

## 2025-08-20 VITALS
DIASTOLIC BLOOD PRESSURE: 68 MMHG | TEMPERATURE: 97.5 F | SYSTOLIC BLOOD PRESSURE: 136 MMHG | RESPIRATION RATE: 18 BRPM | HEART RATE: 64 BPM | OXYGEN SATURATION: 100 % | BODY MASS INDEX: 19.14 KG/M2 | WEIGHT: 98 LBS

## 2025-08-20 DIAGNOSIS — Z71.2 ENCOUNTER TO DISCUSS TEST RESULTS: ICD-10-CM

## 2025-08-20 DIAGNOSIS — K70.31 ALCOHOLIC CIRRHOSIS OF LIVER WITH ASCITES (MULTI): ICD-10-CM

## 2025-08-20 DIAGNOSIS — K82.4 GALLBLADDER POLYP: Primary | ICD-10-CM

## 2025-08-20 DIAGNOSIS — K70.9 ALCOHOLIC LIVER DISEASE: ICD-10-CM

## 2025-08-20 PROCEDURE — 1126F AMNT PAIN NOTED NONE PRSNT: CPT | Performed by: INTERNAL MEDICINE

## 2025-08-20 PROCEDURE — G2211 COMPLEX E/M VISIT ADD ON: HCPCS | Performed by: INTERNAL MEDICINE

## 2025-08-20 PROCEDURE — 3078F DIAST BP <80 MM HG: CPT | Performed by: INTERNAL MEDICINE

## 2025-08-20 PROCEDURE — 1159F MED LIST DOCD IN RCRD: CPT | Performed by: INTERNAL MEDICINE

## 2025-08-20 PROCEDURE — 99212 OFFICE O/P EST SF 10 MIN: CPT

## 2025-08-20 PROCEDURE — 99214 OFFICE O/P EST MOD 30 MIN: CPT | Performed by: INTERNAL MEDICINE

## 2025-08-20 PROCEDURE — 3075F SYST BP GE 130 - 139MM HG: CPT | Performed by: INTERNAL MEDICINE

## 2025-08-20 ASSESSMENT — PAIN SCALES - GENERAL: PAINLEVEL_OUTOF10: 0-NO PAIN

## 2025-09-18 ENCOUNTER — APPOINTMENT (OUTPATIENT)
Dept: PRIMARY CARE | Facility: CLINIC | Age: 85
End: 2025-09-18
Payer: MEDICARE

## 2025-10-16 ENCOUNTER — APPOINTMENT (OUTPATIENT)
Dept: OPHTHALMOLOGY | Facility: CLINIC | Age: 85
End: 2025-10-16
Payer: MEDICARE